# Patient Record
Sex: MALE | Race: WHITE | NOT HISPANIC OR LATINO
[De-identification: names, ages, dates, MRNs, and addresses within clinical notes are randomized per-mention and may not be internally consistent; named-entity substitution may affect disease eponyms.]

---

## 2018-09-18 PROBLEM — Z00.00 ENCOUNTER FOR PREVENTIVE HEALTH EXAMINATION: Status: ACTIVE | Noted: 2018-09-18

## 2018-09-24 ENCOUNTER — APPOINTMENT (OUTPATIENT)
Dept: NEUROLOGY | Facility: CLINIC | Age: 53
End: 2018-09-24
Payer: COMMERCIAL

## 2018-09-24 VITALS
HEIGHT: 68.5 IN | SYSTOLIC BLOOD PRESSURE: 116 MMHG | BODY MASS INDEX: 21.72 KG/M2 | WEIGHT: 145 LBS | HEART RATE: 61 BPM | DIASTOLIC BLOOD PRESSURE: 58 MMHG

## 2018-09-24 DIAGNOSIS — Z78.9 OTHER SPECIFIED HEALTH STATUS: ICD-10-CM

## 2018-09-24 PROCEDURE — 99244 OFF/OP CNSLTJ NEW/EST MOD 40: CPT

## 2018-11-16 ENCOUNTER — RX RENEWAL (OUTPATIENT)
Age: 53
End: 2018-11-16

## 2018-11-19 ENCOUNTER — OUTPATIENT (OUTPATIENT)
Dept: OUTPATIENT SERVICES | Facility: HOSPITAL | Age: 53
LOS: 1 days | End: 2018-11-19
Payer: COMMERCIAL

## 2018-11-19 DIAGNOSIS — R47.1 DYSARTHRIA AND ANARTHRIA: ICD-10-CM

## 2018-11-19 DIAGNOSIS — Z51.89 ENCOUNTER FOR OTHER SPECIFIED AFTERCARE: ICD-10-CM

## 2018-11-19 DIAGNOSIS — R47.9 UNSPECIFIED SPEECH DISTURBANCES: ICD-10-CM

## 2018-11-19 DIAGNOSIS — G70.9 MYONEURAL DISORDER, UNSPECIFIED: ICD-10-CM

## 2018-11-21 PROCEDURE — 97010 HOT OR COLD PACKS THERAPY: CPT

## 2018-11-21 PROCEDURE — 92523 SPEECH SOUND LANG COMPREHEN: CPT

## 2018-11-21 PROCEDURE — 97140 MANUAL THERAPY 1/> REGIONS: CPT

## 2018-11-21 PROCEDURE — 97110 THERAPEUTIC EXERCISES: CPT

## 2018-11-26 ENCOUNTER — OUTPATIENT (OUTPATIENT)
Dept: OUTPATIENT SERVICES | Facility: HOSPITAL | Age: 53
LOS: 1 days | End: 2018-11-26
Payer: COMMERCIAL

## 2018-11-26 DIAGNOSIS — R13.10 DYSPHAGIA, UNSPECIFIED: ICD-10-CM

## 2018-11-26 PROCEDURE — 74230 X-RAY XM SWLNG FUNCJ C+: CPT | Mod: 26

## 2018-11-26 PROCEDURE — 74230 X-RAY XM SWLNG FUNCJ C+: CPT

## 2018-11-29 ENCOUNTER — APPOINTMENT (OUTPATIENT)
Dept: NEUROLOGY | Facility: CLINIC | Age: 53
End: 2018-11-29
Payer: COMMERCIAL

## 2018-11-29 PROCEDURE — 95886 MUSC TEST DONE W/N TEST COMP: CPT

## 2018-11-29 PROCEDURE — 95910 NRV CNDJ TEST 7-8 STUDIES: CPT

## 2018-11-29 PROCEDURE — 99214 OFFICE O/P EST MOD 30 MIN: CPT | Mod: 25

## 2019-02-04 ENCOUNTER — APPOINTMENT (OUTPATIENT)
Dept: NEUROLOGY | Facility: CLINIC | Age: 54
End: 2019-02-04
Payer: COMMERCIAL

## 2019-02-04 VITALS
SYSTOLIC BLOOD PRESSURE: 130 MMHG | WEIGHT: 150 LBS | BODY MASS INDEX: 22.47 KG/M2 | HEART RATE: 65 BPM | DIASTOLIC BLOOD PRESSURE: 80 MMHG | HEIGHT: 68.5 IN

## 2019-02-04 PROCEDURE — 99214 OFFICE O/P EST MOD 30 MIN: CPT

## 2019-02-04 NOTE — ASSESSMENT
[FreeTextEntry1] : This nice gentleman has a slowly progressive dysarthria with dysphagia and upper motor neuron signs in arms and legs, as well as tongue fasciculations and fascics on the back with mild atrophy back muscles\par \par The DDx includes MND vs. spinal bulbar atrophy (Sawyer's syndrome) although the latter not likely, no gynecomastia\par \par Needs MRI C spine, testosterone level and will come back for repeat EMG and ultrasound of the tongue to document fasciculations.

## 2019-02-04 NOTE — HISTORY OF PRESENT ILLNESS
[FreeTextEntry1] : Last May started stumbling on words which has slowly progressed to constant slurred speech and in Aug started coughing on liquids and food.  He denies SOB. Denies weakness of the arms or legs.  Denies ptosis or diplopia.  No neck pain or HA.  Denies muscle twitches or cramps.  \par \par Had EMG by Dr. Fitzpatrick this past November which she demonstrated fascics.  AchR ab neg\par \par barium swallow study in November showed delayed transit, was instructed to chin tuck.  \par \par Denies family h/o neuromuscular disease

## 2019-02-04 NOTE — PHYSICAL EXAM
[General Appearance - Alert] : alert [General Appearance - In No Acute Distress] : in no acute distress [Person] : oriented to person [Place] : oriented to place [Time] : oriented to time [Short Term Intact] : short term memory intact [Remote Intact] : remote memory intact [Registration Intact] : recent registration memory intact [Concentration Intact] : normal concentrating ability [Visual Intact] : visual attention was ~T not ~L decreased [Naming Objects] : no difficulty naming common objects [Repeating Phrases] : no difficulty repeating a phrase [Writing A Sentence] : no difficulty writing a sentence [Fluency] : fluency intact [Comprehension] : comprehension intact [Reading] : reading intact [Past History] : adequate knowledge of personal past history [Cranial Nerves Optic (II)] : visual acuity intact bilaterally,  visual fields full to confrontation, pupils equal round and reactive to light [Cranial Nerves Oculomotor (III)] : extraocular motion intact [Cranial Nerves Trigeminal (V)] : facial sensation intact symmetrically [Cranial Nerves Facial (VII)] : face symmetrical [Cranial Nerves Vestibulocochlear (VIII)] : hearing was intact bilaterally [Cranial Nerves Glossopharyngeal (IX)] : tongue and palate midline [Cranial Nerves Accessory (XI - Cranial And Spinal)] : head turning and shoulder shrug symmetric [Motor Tone] : muscle tone was normal in all four extremities [Motor Strength] : muscle strength was normal in all four extremities [Motor Handedness Right-Handed] : the patient is right hand dominant [Sensation Tactile Decrease] : light touch was intact [Sensation Vibration Decrease] : vibration was intact [Proprioception] : proprioception was intact [Abnormal Walk] : normal gait [Balance] : balance was intact [2+] : Biceps left 2+ [4+] : Patella left 4+ [3+] : Ankle jerk left 3+ [Neck Appearance] : the appearance of the neck was normal [Neck Cervical Mass (___cm)] : no neck mass was observed [Jugular Venous Distention Increased] : there was no jugular-venous distention [Thyroid Diffuse Enlargement] : the thyroid was not enlarged [Thyroid Nodule] : there were no palpable thyroid nodules [] : no respiratory distress [Auscultation Breath Sounds / Voice Sounds] : lungs were clear to auscultation bilaterally [Heart Rate And Rhythm] : heart rate was normal and rhythm regular [Heart Sounds] : normal S1 and S2 [Heart Sounds Gallop] : no gallops [Murmurs] : no murmurs [Heart Sounds Pericardial Friction Rub] : no pericardial rub [Past-pointing] : there was no past-pointing [Tremor] : no tremor present [Plantar Reflex Right Only] : normal on the right [Plantar Reflex Left Only] : normal on the left [___] : absent on the right [___] : absent on the left [FreeTextEntry5] : few tongue fascics noted, no tongue atrophy but very slow movements  [FreeTextEntry6] : mild left scapular winging with mild periscapular atrophy, fascics noted left infraspinatus [FreeTextEntry9] : Right deltoid jerk, and right Negron sign

## 2019-02-07 ENCOUNTER — FORM ENCOUNTER (OUTPATIENT)
Age: 54
End: 2019-02-07

## 2019-02-08 ENCOUNTER — OUTPATIENT (OUTPATIENT)
Dept: OUTPATIENT SERVICES | Facility: HOSPITAL | Age: 54
LOS: 1 days | End: 2019-02-08

## 2019-02-08 ENCOUNTER — APPOINTMENT (OUTPATIENT)
Dept: MRI IMAGING | Facility: CLINIC | Age: 54
End: 2019-02-08
Payer: COMMERCIAL

## 2019-02-08 DIAGNOSIS — G12.20 MOTOR NEURON DISEASE, UNSPECIFIED: ICD-10-CM

## 2019-02-08 LAB
TESTOST BND SERPL-MCNC: 14.3 PG/ML
TESTOST SERPL-MCNC: 559.4 NG/DL

## 2019-02-08 PROCEDURE — 72141 MRI NECK SPINE W/O DYE: CPT | Mod: 26

## 2019-03-27 ENCOUNTER — APPOINTMENT (OUTPATIENT)
Dept: NEUROLOGY | Facility: CLINIC | Age: 54
End: 2019-03-27
Payer: COMMERCIAL

## 2019-03-27 LAB — CK SERPL-CCNC: 163 U/L

## 2019-03-27 PROCEDURE — 99213 OFFICE O/P EST LOW 20 MIN: CPT | Mod: 25

## 2019-03-27 PROCEDURE — 94010 BREATHING CAPACITY TEST: CPT

## 2019-03-27 PROCEDURE — 95885 MUSC TST DONE W/NERV TST LIM: CPT

## 2019-03-27 PROCEDURE — 95908 NRV CNDJ TST 3-4 STUDIES: CPT

## 2019-03-27 NOTE — ASSESSMENT
[FreeTextEntry1] : EMG/NCV today shows fascics and spontaneous activity in RLE and RUE and left FDI, with reduced recruitment of large motor units.  There are also fascics and positive sharps seen in right thoracic paraspinals.  \par \par Previous CK has been up to 1581.  PFT's today are reduced, with mild restrictive pattern, and he has new slight weakness of bilateral elbow flexors. \par \par Tongue US today shows deep fascics.  \par \par The above meet [place] and [place] criteria for probable/definite ALS.  Will move to the definite category when there is unquestionable weakness.  Will check anti-gangioside abs today but MMN with CB not a strong consideration.  \par \par Will have patient come back to discuss diagnosis with his wife present.

## 2019-03-27 NOTE — PHYSICAL EXAM
[Person] : oriented to person [Place] : oriented to place [Time] : oriented to time [Short Term Intact] : short term memory intact [Remote Intact] : remote memory intact [Registration Intact] : recent registration memory intact [Concentration Intact] : normal concentrating ability [Visual Intact] : visual attention was ~T not ~L decreased [Naming Objects] : no difficulty naming common objects [Repeating Phrases] : no difficulty repeating a phrase [Writing A Sentence] : no difficulty writing a sentence [Fluency] : fluency intact [Comprehension] : comprehension intact [Reading] : reading intact [Past History] : adequate knowledge of personal past history [Cranial Nerves Optic (II)] : visual acuity intact bilaterally,  visual fields full to confrontation, pupils equal round and reactive to light [Cranial Nerves Oculomotor (III)] : extraocular motion intact [Cranial Nerves Trigeminal (V)] : facial sensation intact symmetrically [Cranial Nerves Facial (VII)] : face symmetrical [Cranial Nerves Vestibulocochlear (VIII)] : hearing was intact bilaterally [Cranial Nerves Glossopharyngeal (IX)] : tongue and palate midline [Cranial Nerves Accessory (XI - Cranial And Spinal)] : head turning and shoulder shrug symmetric [Motor Tone] : muscle tone was normal in all four extremities [Motor Handedness Right-Handed] : the patient is right hand dominant [+4] : arm flexion +4/5 [5] : ankle plantar flexion 5/5 [Sensation Tactile Decrease] : light touch was intact [Sensation Vibration Decrease] : vibration was intact [Proprioception] : proprioception was intact [Abnormal Walk] : normal gait [Balance] : balance was intact [2+] : Biceps left 2+ [4+] : Patella left 4+ [3+] : Ankle jerk left 3+ [Plantar Reflex Right Only] : abnormal on the right [Hand Weakness Right] : normal hand  [Hand Weakness Left] : normal hand  [Past-pointing] : there was no past-pointing [Tremor] : no tremor present [Plantar Reflex Left Only] : normal on the left [___] : absent on the right [___] : absent on the left [FreeTextEntry5] : few tongue fascics noted, no tongue atrophy but very slow movements  [FreeTextEntry6] : mild left scapular winging with mild periscapular atrophy, fascics noted left infraspinatus. PFT's: FVC 5.7, FEV1 3.2 87% predicted [FreeTextEntry9] : Right deltoid jerk, and right Negron sign

## 2019-03-27 NOTE — HISTORY OF PRESENT ILLNESS
[FreeTextEntry1] : Patient notes no change since last visit, no weakness.  Here for EMG.  \par \par Testosterone normal.  MRI C spine normal

## 2019-03-30 LAB
GD1A GANGL IGG TITR SER IA: NORMAL
GD1A GANGL IGM TITR SER IA: NORMAL
GD1B GANGL IGG TITR SER: NORMAL
GD1B GANGL IGM TITR SER: NORMAL
GM1 ASIALO IGG TITR SER: NORMAL
GM1 ASIALO IGM TITR SER: NORMAL

## 2019-04-01 ENCOUNTER — APPOINTMENT (OUTPATIENT)
Dept: NEUROLOGY | Facility: CLINIC | Age: 54
End: 2019-04-01
Payer: COMMERCIAL

## 2019-04-01 VITALS
BODY MASS INDEX: 21.42 KG/M2 | WEIGHT: 143 LBS | HEART RATE: 87 BPM | DIASTOLIC BLOOD PRESSURE: 86 MMHG | HEIGHT: 68.5 IN | SYSTOLIC BLOOD PRESSURE: 130 MMHG

## 2019-04-01 PROCEDURE — 99215 OFFICE O/P EST HI 40 MIN: CPT

## 2019-04-01 NOTE — PHYSICAL EXAM
[Person] : oriented to person [Place] : oriented to place [Time] : oriented to time [Short Term Intact] : short term memory intact [Remote Intact] : remote memory intact [Registration Intact] : recent registration memory intact [Concentration Intact] : normal concentrating ability [Visual Intact] : visual attention was ~T not ~L decreased [Naming Objects] : no difficulty naming common objects [Repeating Phrases] : no difficulty repeating a phrase [Writing A Sentence] : no difficulty writing a sentence [Fluency] : fluency intact [Comprehension] : comprehension intact [Reading] : reading intact [Past History] : adequate knowledge of personal past history [Cranial Nerves Optic (II)] : visual acuity intact bilaterally,  visual fields full to confrontation, pupils equal round and reactive to light [Cranial Nerves Oculomotor (III)] : extraocular motion intact [Cranial Nerves Trigeminal (V)] : facial sensation intact symmetrically [Cranial Nerves Facial (VII)] : face symmetrical [Cranial Nerves Vestibulocochlear (VIII)] : hearing was intact bilaterally [Cranial Nerves Glossopharyngeal (IX)] : tongue and palate midline [Cranial Nerves Accessory (XI - Cranial And Spinal)] : head turning and shoulder shrug symmetric [Motor Tone] : muscle tone was normal in all four extremities [Motor Handedness Right-Handed] : the patient is right hand dominant [Hand Weakness Right] : normal hand  [+4] : arm flexion +4/5 [Hand Weakness Left] : normal hand  [5] : ankle plantar flexion 5/5 [Sensation Tactile Decrease] : light touch was intact [Sensation Vibration Decrease] : vibration was intact [Proprioception] : proprioception was intact [Abnormal Walk] : normal gait [Balance] : balance was intact [Past-pointing] : there was no past-pointing [Tremor] : no tremor present [2+] : Biceps left 2+ [4+] : Patella left 4+ [3+] : Ankle jerk left 3+ [Plantar Reflex Right Only] : abnormal on the right [Plantar Reflex Left Only] : normal on the left [___] : absent on the right [___] : absent on the left [FreeTextEntry5] : few tongue fascics noted, no tongue atrophy but very slow movements  [FreeTextEntry6] : mild left scapular winging with mild periscapular atrophy, fascics noted left infraspinatus. PFT's: FVC 5.7, FEV1 3.2 87% predicted\par ALS-FRS 47/48 [FreeTextEntry9] : Right deltoid jerk, and right Negron sign

## 2019-04-01 NOTE — HISTORY OF PRESENT ILLNESS
[FreeTextEntry1] : Patient presents with wife for results and diagnosis discussion\par \par Anti-GM1 Ab negative

## 2019-04-01 NOTE — ASSESSMENT
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  We will get genetic testing as the diagnosis is early and if he has C9 or SOD1 mutation he may qualify for clinical trials. \par \par Will start riluzole 50mg BID, check LFT's at next visit (baseline were normal).  Saliva management not needed.  They met social work today and will engage with him at next visit in two months. \par \par 90 min spent on encounter and counseling

## 2019-04-09 ENCOUNTER — FORM ENCOUNTER (OUTPATIENT)
Age: 54
End: 2019-04-09

## 2019-04-10 ENCOUNTER — APPOINTMENT (OUTPATIENT)
Dept: CT IMAGING | Facility: CLINIC | Age: 54
End: 2019-04-10
Payer: COMMERCIAL

## 2019-04-10 ENCOUNTER — LABORATORY RESULT (OUTPATIENT)
Age: 54
End: 2019-04-10

## 2019-04-10 ENCOUNTER — APPOINTMENT (OUTPATIENT)
Dept: INTERNAL MEDICINE | Facility: CLINIC | Age: 54
End: 2019-04-10
Payer: COMMERCIAL

## 2019-04-10 ENCOUNTER — OUTPATIENT (OUTPATIENT)
Dept: OUTPATIENT SERVICES | Facility: HOSPITAL | Age: 54
LOS: 1 days | End: 2019-04-10
Payer: COMMERCIAL

## 2019-04-10 VITALS
HEART RATE: 97 BPM | BODY MASS INDEX: 20.83 KG/M2 | OXYGEN SATURATION: 96 % | WEIGHT: 139 LBS | DIASTOLIC BLOOD PRESSURE: 72 MMHG | RESPIRATION RATE: 18 BRPM | HEIGHT: 68.5 IN | TEMPERATURE: 99.5 F | SYSTOLIC BLOOD PRESSURE: 120 MMHG

## 2019-04-10 DIAGNOSIS — Z00.8 ENCOUNTER FOR OTHER GENERAL EXAMINATION: ICD-10-CM

## 2019-04-10 PROCEDURE — 94060 EVALUATION OF WHEEZING: CPT

## 2019-04-10 PROCEDURE — 94729 DIFFUSING CAPACITY: CPT

## 2019-04-10 PROCEDURE — 99244 OFF/OP CNSLTJ NEW/EST MOD 40: CPT | Mod: 25

## 2019-04-10 PROCEDURE — ZZZZZ: CPT

## 2019-04-10 PROCEDURE — 94664 DEMO&/EVAL PT USE INHALER: CPT | Mod: 59

## 2019-04-10 PROCEDURE — 94727 GAS DIL/WSHOT DETER LNG VOL: CPT

## 2019-04-10 PROCEDURE — 71250 CT THORAX DX C-: CPT | Mod: 26

## 2019-04-10 PROCEDURE — 71250 CT THORAX DX C-: CPT

## 2019-04-10 NOTE — PROCEDURE
[FreeTextEntry1] : Full pulmonary function testing is within normal limits with an FEV1 of 3.95 her 111% predicted. Diffusing capacity is normal.\par \par CT scan of chest today shows opacities in the posterior left lower lobe consistent with pneumonia or aspiration pneumonia.  4 mm nodule in peripheral  right lower lobe. Partially visualized hypodense lesions in liver may be hemangiomas (MRI or CT with and without contrast recommended).

## 2019-04-10 NOTE — REVIEW OF SYSTEMS
[As Noted in HPI] : as noted in HPI [Cough] : cough [Sputum] : sputum  [Chest Discomfort] : no chest discomfort [Palpitations] : no palpitations [Dizziness] : no dizziness [Syncope] : no fainting

## 2019-04-10 NOTE — CONSULT LETTER
[Dear  ___] : Dear ~OSBALDO, [Consult Letter:] : I had the pleasure of evaluating your patient, [unfilled]. [Please see my note below.] : Please see my note below. [Consult Closing:] : Thank you very much for allowing me to participate in the care of this patient.  If you have any questions, please do not hesitate to contact me. [Sincerely,] : Sincerely, [FreeTextEntry2] : Dr Tadeo Serrano [FreeTextEntry3] : Noman Mueller MD

## 2019-04-10 NOTE — ASSESSMENT
[FreeTextEntry1] : #1 likely aspiration pneumonia posterior left lower lobe. CBC and CMP drawn today. Patient to treat with doxycycline 100 mg p.o. b.i.d. x10 days. Proair 2 puffs q.i.d. prn.  If doing well and improving for following appointment with Dr Way in one week. Call or return if not improving. Case discussed with patient's neurologist, Dr. SHAN Rosario.  He will arrange for a swallow evaluation. He also mentioned that he may have a discussion with patient regarding possible tube feeding versus no tube feeding. Patient is also scheduled to meet with  via neurology.\par \par #2 ALS. On Riluzole treatnment. \par \par #3  4 mm RLL nodule.  Consider repeat CT chest in 1 year.\par \par #4 Liver hypodense lesions seen on CT chest.  MRI or CT with and without contrast recommended. Address after treatment of acute illness.\par \par

## 2019-04-10 NOTE — PHYSICAL EXAM
[General Appearance - Well Developed] : well developed [Normal Appearance] : normal appearance [Well Groomed] : well groomed [General Appearance - Well Nourished] : well nourished [No Deformities] : no deformities [General Appearance - In No Acute Distress] : no acute distress [Normal Conjunctiva] : the conjunctiva exhibited no abnormalities [Eyelids - No Xanthelasma] : the eyelids demonstrated no xanthelasmas [Normal Oropharynx] : normal oropharynx [Neck Appearance] : the appearance of the neck was normal [Neck Cervical Mass (___cm)] : no neck mass was observed [Jugular Venous Distention Increased] : there was no jugular-venous distention [Thyroid Diffuse Enlargement] : the thyroid was not enlarged [Heart Rate And Rhythm] : heart rate and rhythm were normal [Heart Sounds] : normal S1 and S2 [Murmurs] : no murmurs present [Edema] : no peripheral edema present [Respiration, Rhythm And Depth] : normal respiratory rhythm and effort [Exaggerated Use Of Accessory Muscles For Inspiration] : no accessory muscle use [Abdomen Soft] : soft [Abdomen Tenderness] : non-tender [Abdomen Mass (___ Cm)] : no abdominal mass palpated [Nail Clubbing] : no clubbing of the fingernails [Cyanosis, Localized] : no localized cyanosis [Skin Turgor] : normal skin turgor [] : no rash [Impaired Insight] : insight and judgment were intact [Affect] : the affect was normal [FreeTextEntry1] : see above. Slow speech

## 2019-04-10 NOTE — HISTORY OF PRESENT ILLNESS
[FreeTextEntry1] : This is a pleasant 53-year-old male referred for pulmonary consultation by Dr ERNESTO Serrano.  A report will be sent to him.  The patient has a history of recently diagnosed ALS.  He was started on Riluzole treatment on April 1. He developed an illness on March 17 in which he felt run down and had nasal congestion and felt sick. After a few days, he developed cough which has persisted. He was treated with amoxicillin for 10 days on March 25 and then more recently with Z-Talon 4 days ago. He continues to have cough with some light green sputum production. Temperature date today is 99 5.  He is not noting wheezing or dyspnea on exertion. No hemoptysis. He is a nonsmoker. No past history of asthma or COPD. He was told that with his ALS, he does have weakness of his tongue and bulbar involvement and is at risk for aspiration. He tells me that he almost choked in the past on 3 or 4 occasions.\par \par He denies fever or chills, though his temperature here today is 99.5.\par \par He denies being around sick contacts.

## 2019-04-11 LAB
ALBUMIN SERPL ELPH-MCNC: 4.7 G/DL
ALP BLD-CCNC: 97 U/L
ALT SERPL-CCNC: 18 U/L
ANION GAP SERPL CALC-SCNC: 13 MMOL/L
AST SERPL-CCNC: 22 U/L
BASOPHILS # BLD AUTO: 0.09 K/UL
BASOPHILS NFR BLD AUTO: 0.7 %
BILIRUB SERPL-MCNC: 0.3 MG/DL
BUN SERPL-MCNC: 15 MG/DL
CALCIUM SERPL-MCNC: 9.7 MG/DL
CHLORIDE SERPL-SCNC: 102 MMOL/L
CO2 SERPL-SCNC: 28 MMOL/L
CREAT SERPL-MCNC: 0.95 MG/DL
EOSINOPHIL # BLD AUTO: 0.15 K/UL
EOSINOPHIL NFR BLD AUTO: 1.2 %
GLUCOSE SERPL-MCNC: 128 MG/DL
HCT VFR BLD CALC: 44.7 %
HGB BLD-MCNC: 14.8 G/DL
IMM GRANULOCYTES NFR BLD AUTO: 0.3 %
LYMPHOCYTES # BLD AUTO: 5.22 K/UL
LYMPHOCYTES NFR BLD AUTO: 40 %
MAN DIFF?: NORMAL
MCHC RBC-ENTMCNC: 31.2 PG
MCHC RBC-ENTMCNC: 33.1 GM/DL
MCV RBC AUTO: 94.3 FL
MONOCYTES # BLD AUTO: 1.09 K/UL
MONOCYTES NFR BLD AUTO: 8.4 %
NEUTROPHILS # BLD AUTO: 6.45 K/UL
NEUTROPHILS NFR BLD AUTO: 49.4 %
PLATELET # BLD AUTO: 293 K/UL
POTASSIUM SERPL-SCNC: 3.8 MMOL/L
PROT SERPL-MCNC: 7.1 G/DL
RBC # BLD: 4.74 M/UL
RBC # FLD: 12.4 %
SODIUM SERPL-SCNC: 143 MMOL/L
WBC # FLD AUTO: 13.04 K/UL

## 2019-04-17 ENCOUNTER — NON-APPOINTMENT (OUTPATIENT)
Age: 54
End: 2019-04-17

## 2019-04-17 ENCOUNTER — APPOINTMENT (OUTPATIENT)
Dept: INTERNAL MEDICINE | Facility: CLINIC | Age: 54
End: 2019-04-17
Payer: COMMERCIAL

## 2019-04-17 VITALS
BODY MASS INDEX: 21.07 KG/M2 | OXYGEN SATURATION: 97 % | HEART RATE: 70 BPM | DIASTOLIC BLOOD PRESSURE: 80 MMHG | RESPIRATION RATE: 16 BRPM | SYSTOLIC BLOOD PRESSURE: 120 MMHG | WEIGHT: 139 LBS | TEMPERATURE: 98.6 F | HEIGHT: 68 IN

## 2019-04-17 DIAGNOSIS — Z82.3 FAMILY HISTORY OF STROKE: ICD-10-CM

## 2019-04-17 DIAGNOSIS — Z82.0 FAMILY HISTORY OF EPILEPSY AND OTHER DISEASES OF THE NERVOUS SYSTEM: ICD-10-CM

## 2019-04-17 DIAGNOSIS — Z81.8 FAMILY HISTORY OF OTHER MENTAL AND BEHAVIORAL DISORDERS: ICD-10-CM

## 2019-04-17 DIAGNOSIS — Z82.61 FAMILY HISTORY OF ARTHRITIS: ICD-10-CM

## 2019-04-17 PROCEDURE — 99214 OFFICE O/P EST MOD 30 MIN: CPT | Mod: 25

## 2019-04-17 PROCEDURE — 94060 EVALUATION OF WHEEZING: CPT

## 2019-04-17 NOTE — PLAN
[FreeTextEntry1] : 1. The patient will complete a full course of doxycycline as prescribed.\par \par 2. The patient will begin Mucinex 600 mg b.i.d. on a chronic basis to act as a mucolytic agent.\par \par 3. The patient will purchase a [employer] Threshold Intermittent Muscle Trainer (IMT), on line. I have told him that he could use this device as many times during the day as he can. He can increase the resistance. The objectives will be to help to improve his muscle strength regarding his internal and external intercostals, as well as his diaphragm.\par \par 4. The patient may resume his regular exercise after completing the doxycycline.\par \par 5. The patient will undergo a repeat CAT scan of the chest in 6 weeks to reevaluate the left lower lobe. I am not certain whether the findings noted on the CAT scan represented just areas of bronchiectasis with mucus plugging versus an acute pneumonic process (possibly including an aspiration syndrome) versus an atypical infection such as MAHSA due to the underlying bronchiectatic changes in that location.\par \par 6. The patient will followup with me in 6 weeks for a reevaluation with pre-post spirometry. Based on the findings of the CAT scan at that time, I may consider institution of Zithromax 500 mg t.i.w. as chronic treatment for the underlying bronchiectasis. At some point in the future, he may need to obtain a device for him to measure his vital capacity and negative inspiratory force on a daily basis. Again, we will be monitoring for progressive respiratory fatigue.\par \par 7. Routine medical followup with his primary care physician, Dr. Serrano.

## 2019-04-17 NOTE — PHYSICAL EXAM
[Well Nourished] : well nourished [Supple] : supple [No Respiratory Distress] : no respiratory distress  [Normal Oropharynx] : the oropharynx was normal [Normal Rate] : normal rate  [Normal S1, S2] : normal S1 and S2 [Regular Rhythm] : with a regular rhythm [Soft] : abdomen soft [No Extremity Clubbing/Cyanosis] : no extremity clubbing/cyanosis [No Edema] : there was no peripheral edema [Normal Bowel Sounds] : normal bowel sounds [Normal Posterior Cervical Nodes] : no posterior cervical lymphadenopathy [Normal Supraclavicular Nodes] : no supraclavicular lymphadenopathy [No CVA Tenderness] : no CVA  tenderness [Normal Anterior Cervical Nodes] : no anterior cervical lymphadenopathy [Grossly Normal Strength/Tone] : grossly normal strength/tone [de-identified] : There are few coarse breath sounds at the left base. A minimal end expiratory wheezes noted

## 2019-04-17 NOTE — DATA REVIEWED
[FreeTextEntry1] : Spirometric analysis is within normal limits. Mild bronchodilator reactivity is demonstrated.\par \par CAT scan of the chest performed on 4/10/19 is reviewed. The patient is noted to have changes in the left lower lung zone which initially were read as groundglass and tree in bud opacities. I reviewed this further with the radiologist, because I felt that there may be a component of bronchiectasis. He now agrees, and stated that he would place an addendum to his original reading. This may represent mucous plugging.

## 2019-04-17 NOTE — HISTORY OF PRESENT ILLNESS
[de-identified] : The patient comes in today for a followup pulmonary evaluation.\par \par The patient was initially seen, one week ago, and Dr. Mueller. He ordered a CAT scan of the chest, due to the fact that the patient has had a chronic cough. The patient was recently also diagnosed with ALS. A CAT scan of the chest revealed an infiltrative process in the left lower lungs. He was initially read as pneumonia. There was also a small 4 mm nodule in the right lower lobe peripherally. He was placed on doxycycline 100 mg b.i.d. for 10 days.\par \par Patient now comes in stating that overall he does feel better with regard to his pulmonary status. He notes that the symptoms have diminished significantly since starting the antibiotic. He has scant sputum. He never felt as if he had an acute febrile process. There was no significant shortness of breath. He denies having any choking when swallowing.\par \par The patient notes that his muscle strength is still good. His major issue continues to be that of speech and bulbar function. Of note is the fact that he was exercising up until approximately one month ago. He did not feel any weakness in his peripheral muscles. He now comes in for this assessment

## 2019-04-18 ENCOUNTER — TRANSCRIPTION ENCOUNTER (OUTPATIENT)
Age: 54
End: 2019-04-18

## 2019-05-15 ENCOUNTER — TRANSCRIPTION ENCOUNTER (OUTPATIENT)
Age: 54
End: 2019-05-15

## 2019-05-16 ENCOUNTER — TRANSCRIPTION ENCOUNTER (OUTPATIENT)
Age: 54
End: 2019-05-16

## 2019-05-30 ENCOUNTER — TRANSCRIPTION ENCOUNTER (OUTPATIENT)
Age: 54
End: 2019-05-30

## 2019-06-04 ENCOUNTER — FORM ENCOUNTER (OUTPATIENT)
Age: 54
End: 2019-06-04

## 2019-06-05 ENCOUNTER — OTHER (OUTPATIENT)
Age: 54
End: 2019-06-05

## 2019-06-05 ENCOUNTER — APPOINTMENT (OUTPATIENT)
Dept: NEUROLOGY | Facility: CLINIC | Age: 54
End: 2019-06-05

## 2019-06-05 ENCOUNTER — APPOINTMENT (OUTPATIENT)
Dept: NEUROLOGY | Facility: CLINIC | Age: 54
End: 2019-06-05
Payer: COMMERCIAL

## 2019-06-05 ENCOUNTER — APPOINTMENT (OUTPATIENT)
Dept: CT IMAGING | Facility: CLINIC | Age: 54
End: 2019-06-05
Payer: COMMERCIAL

## 2019-06-05 ENCOUNTER — OUTPATIENT (OUTPATIENT)
Dept: OUTPATIENT SERVICES | Facility: HOSPITAL | Age: 54
LOS: 1 days | End: 2019-06-05
Payer: COMMERCIAL

## 2019-06-05 VITALS
BODY MASS INDEX: 22.43 KG/M2 | HEIGHT: 68 IN | WEIGHT: 148 LBS | DIASTOLIC BLOOD PRESSURE: 88 MMHG | SYSTOLIC BLOOD PRESSURE: 153 MMHG | HEART RATE: 80 BPM

## 2019-06-05 DIAGNOSIS — Z00.8 ENCOUNTER FOR OTHER GENERAL EXAMINATION: ICD-10-CM

## 2019-06-05 PROCEDURE — 71250 CT THORAX DX C-: CPT

## 2019-06-05 PROCEDURE — 99215 OFFICE O/P EST HI 40 MIN: CPT

## 2019-06-05 PROCEDURE — 71250 CT THORAX DX C-: CPT | Mod: 26

## 2019-06-05 NOTE — PHYSICAL EXAM
[Person] : oriented to person [Place] : oriented to place [Time] : oriented to time [Short Term Intact] : short term memory intact [Remote Intact] : remote memory intact [Registration Intact] : recent registration memory intact [Concentration Intact] : normal concentrating ability [Visual Intact] : visual attention was ~T not ~L decreased [Naming Objects] : no difficulty naming common objects [Fluency] : fluency intact [Writing A Sentence] : no difficulty writing a sentence [Repeating Phrases] : no difficulty repeating a phrase [Reading] : reading intact [Comprehension] : comprehension intact [Past History] : adequate knowledge of personal past history [Cranial Nerves Oculomotor (III)] : extraocular motion intact [Cranial Nerves Optic (II)] : visual acuity intact bilaterally,  visual fields full to confrontation, pupils equal round and reactive to light [Cranial Nerves Facial (VII)] : face symmetrical [Cranial Nerves Trigeminal (V)] : facial sensation intact symmetrically [Cranial Nerves Vestibulocochlear (VIII)] : hearing was intact bilaterally [Cranial Nerves Accessory (XI - Cranial And Spinal)] : head turning and shoulder shrug symmetric [Cranial Nerves Glossopharyngeal (IX)] : tongue and palate midline [Motor Tone] : muscle tone was normal in all four extremities [Motor Handedness Right-Handed] : the patient is right hand dominant [Hand Weakness Right] : normal hand  [+4] : arm flexion +4/5 [Hand Weakness Left] : normal hand  [5] : ankle plantar flexion 5/5 [Sensation Tactile Decrease] : light touch was intact [Sensation Vibration Decrease] : vibration was intact [Proprioception] : proprioception was intact [Abnormal Walk] : normal gait [Past-pointing] : there was no past-pointing [Balance] : balance was intact [Tremor] : no tremor present [2+] : Ankle jerk right 2+ [4+] : Patella left 4+ [Plantar Reflex Right Only] : abnormal on the right [3+] : Ankle jerk left 3+ [___] : absent on the right [Plantar Reflex Left Only] : normal on the left [FreeTextEntry5] : few tongue fascics noted, no tongue atrophy but very slow movements  [___] : absent on the left [FreeTextEntry6] : mild left scapular winging with mild periscapular atrophy, fascics noted left infraspinatus. PFT's: FVC 5.7, FEV1 3.2 87% predicted\par ALS-FRS 46/48 [FreeTextEntry9] : Right deltoid jerk, and right Negron sign

## 2019-06-05 NOTE — ASSESSMENT
[FreeTextEntry1] : Patient is doing very well, only 1 point drop in ALS-FRS.  Has started going to support group.\par \par Cont riluzole 50mg BID, check LFT's and CBC today. \par \par Seeing social work today.  We have discussed that he likely will need PEG feedings by end of the year, he has prescription for swallow eval and will do that this summer.  He starts disability July 1. \par \par I have discussed ventilation with patient and told him we will monitor and when he shows signs of SOB will discuss his wishes for NIV or intubation at that time.  \par

## 2019-06-05 NOTE — HISTORY OF PRESENT ILLNESS
[FreeTextEntry1] : Patient and wife think his speech is worse.  He has noted some arm twitches.  Denies muscle cramps.  Tolerating riluzole.  No swallowing problems, no coughing on liquids lately.  No pooling or excess saliva.  Denies any limb weakness.  He has req for genetics but hasn't had drawn yet.  Has met with Arturo and will see today.  \par \dina Has met with [facility] social work and is going to support group.

## 2019-06-06 ENCOUNTER — NON-APPOINTMENT (OUTPATIENT)
Age: 54
End: 2019-06-06

## 2019-06-06 ENCOUNTER — TRANSCRIPTION ENCOUNTER (OUTPATIENT)
Age: 54
End: 2019-06-06

## 2019-06-06 ENCOUNTER — APPOINTMENT (OUTPATIENT)
Dept: INTERNAL MEDICINE | Facility: CLINIC | Age: 54
End: 2019-06-06
Payer: COMMERCIAL

## 2019-06-06 VITALS
RESPIRATION RATE: 18 BRPM | HEIGHT: 68 IN | BODY MASS INDEX: 22.73 KG/M2 | WEIGHT: 150 LBS | OXYGEN SATURATION: 97 % | SYSTOLIC BLOOD PRESSURE: 126 MMHG | HEART RATE: 71 BPM | TEMPERATURE: 98.9 F | DIASTOLIC BLOOD PRESSURE: 84 MMHG

## 2019-06-06 DIAGNOSIS — J69.0 PNEUMONITIS DUE TO INHALATION OF FOOD AND VOMIT: ICD-10-CM

## 2019-06-06 PROCEDURE — 99214 OFFICE O/P EST MOD 30 MIN: CPT | Mod: 25

## 2019-06-06 PROCEDURE — 94060 EVALUATION OF WHEEZING: CPT

## 2019-06-06 RX ORDER — ALBUTEROL SULFATE 90 UG/1
108 (90 BASE) AEROSOL, METERED RESPIRATORY (INHALATION)
Qty: 1 | Refills: 0 | Status: DISCONTINUED | COMMUNITY
Start: 2019-04-10 | End: 2019-06-06

## 2019-06-06 RX ORDER — DOXYCYCLINE 100 MG/1
100 CAPSULE ORAL
Qty: 20 | Refills: 0 | Status: DISCONTINUED | COMMUNITY
Start: 2019-04-10 | End: 2019-06-06

## 2019-06-06 NOTE — PLAN
[FreeTextEntry1] : 1. Continue with medication as outlined above, including Mucinex on a b.i.d. basis as a mucolytic agent.\par \par 2. Given the fact that there has been resolution of the infiltrates and mucus plugging previously documented, we will hold off on Zithromax as treatment for bronchiectasis at this time. We will reconsider this in the future.\par \par 3. The patient will continue with his Respironics threshold intermittent muscle , at least twice a day to maintain his respiratory muscle function.\par \par 4. The patient may need a device to measure his vital capacity and negative inspiratory force in the future.\par \par 5. The patient may require a PEG later in the year for nutrition.\par \par 6. Routine medical followup with Dr. Serrano.\par \par 7. Follow up with myself in 4 months with spirometry and O2 saturation.

## 2019-06-06 NOTE — PHYSICAL EXAM
[Well Nourished] : well nourished [Normal Oropharynx] : the oropharynx was normal [Supple] : supple [No Respiratory Distress] : no respiratory distress  [Clear to Auscultation] : lungs were clear to auscultation bilaterally [Normal Rate] : normal rate  [Regular Rhythm] : with a regular rhythm [Normal S1, S2] : normal S1 and S2 [No Edema] : there was no peripheral edema [No Extremity Clubbing/Cyanosis] : no extremity clubbing/cyanosis [Normal Bowel Sounds] : normal bowel sounds [Soft] : abdomen soft [Normal Posterior Cervical Nodes] : no posterior cervical lymphadenopathy [Normal Supraclavicular Nodes] : no supraclavicular lymphadenopathy [Normal Anterior Cervical Nodes] : no anterior cervical lymphadenopathy [No CVA Tenderness] : no CVA  tenderness [Grossly Normal Strength/Tone] : grossly normal strength/tone

## 2019-06-06 NOTE — DATA REVIEWED
[FreeTextEntry1] : Spirometric analysis is within normal limits. Minimal bronchodilator reactivity is demonstrated.\par \par CAT scan of the chest performed on 6/5/19 is reviewed. The previously noted tree in bud/infiltrate in the left lower lung field has improved. A small nodule remains.

## 2019-06-06 NOTE — HISTORY OF PRESENT ILLNESS
[de-identified] : The patient comes in today for a followup evaluation and reassessment. There are several issues to discuss.\par \par The patient was last seen on 4/17/19 for a pulmonary assessment. The patient was recently diagnosed with bulbar ALS. A CAT scan of the chest at that time revealed an infiltrative process in the lower lung zones. There was concern that he may have aspirated. He was treated aggressively with doxycycline. He was also placed on Mucinex as a mucolytic agent due to the fact that he appeared to have bronchiectatic changes in his lungs. He noted that within several days the cough improved significantly.\par \par The patient also obtained a threshold intermittent muscle . This was to help improve his respiratory muscle function and increase his resistance. He states that he has been fairly compliant with this using this once or twice a day. He was recently reevaluated by his neurologist and his strength remains fairly stable at this time. The patient continues to exercise fairly regularly performing pushups and pull-ups. He denies any overt dyspnea. There has been no cough. He has been eating well, and he has gained 10 pounds. He states that he has not had any choking episodes while swallowing.

## 2019-06-10 ENCOUNTER — TRANSCRIPTION ENCOUNTER (OUTPATIENT)
Age: 54
End: 2019-06-10

## 2019-06-11 ENCOUNTER — TRANSCRIPTION ENCOUNTER (OUTPATIENT)
Age: 54
End: 2019-06-11

## 2019-06-20 ENCOUNTER — TRANSCRIPTION ENCOUNTER (OUTPATIENT)
Age: 54
End: 2019-06-20

## 2019-07-18 ENCOUNTER — LABORATORY RESULT (OUTPATIENT)
Age: 54
End: 2019-07-18

## 2019-07-18 LAB
ALBUMIN SERPL ELPH-MCNC: 4.9 G/DL
ALP BLD-CCNC: 88 U/L
ALT SERPL-CCNC: 20 U/L
AST SERPL-CCNC: 23 U/L
BASOPHILS # BLD AUTO: 0.05 K/UL
BASOPHILS NFR BLD AUTO: 0.5 %
BILIRUB DIRECT SERPL-MCNC: 0.2 MG/DL
BILIRUB INDIRECT SERPL-MCNC: 0.5 MG/DL
BILIRUB SERPL-MCNC: 0.6 MG/DL
EOSINOPHIL # BLD AUTO: 0.13 K/UL
EOSINOPHIL NFR BLD AUTO: 1.3 %
HCT VFR BLD CALC: 46.3 %
HGB BLD-MCNC: 15.4 G/DL
IMM GRANULOCYTES NFR BLD AUTO: 0.3 %
LYMPHOCYTES # BLD AUTO: 4.56 K/UL
LYMPHOCYTES NFR BLD AUTO: 45.1 %
MAN DIFF?: NORMAL
MCHC RBC-ENTMCNC: 31.2 PG
MCHC RBC-ENTMCNC: 33.3 GM/DL
MCV RBC AUTO: 93.9 FL
MONOCYTES # BLD AUTO: 0.72 K/UL
MONOCYTES NFR BLD AUTO: 7.1 %
NEUTROPHILS # BLD AUTO: 4.61 K/UL
NEUTROPHILS NFR BLD AUTO: 45.7 %
PLATELET # BLD AUTO: 220 K/UL
PROT SERPL-MCNC: 6.9 G/DL
RBC # BLD: 4.93 M/UL
RBC # FLD: 12.9 %
WBC # FLD AUTO: 10.1 K/UL

## 2019-08-08 ENCOUNTER — TRANSCRIPTION ENCOUNTER (OUTPATIENT)
Age: 54
End: 2019-08-08

## 2019-09-16 ENCOUNTER — APPOINTMENT (OUTPATIENT)
Dept: NEUROLOGY | Facility: CLINIC | Age: 54
End: 2019-09-16
Payer: COMMERCIAL

## 2019-09-16 VITALS
HEART RATE: 87 BPM | SYSTOLIC BLOOD PRESSURE: 147 MMHG | BODY MASS INDEX: 22.58 KG/M2 | DIASTOLIC BLOOD PRESSURE: 88 MMHG | WEIGHT: 149 LBS | HEIGHT: 68 IN

## 2019-09-16 PROCEDURE — 99214 OFFICE O/P EST MOD 30 MIN: CPT

## 2019-09-16 NOTE — HISTORY OF PRESENT ILLNESS
[FreeTextEntry1] : Patient and wife state his speech is worse.  He states he has some trouble chewing due to tongue, occasional trouble swallowing pills.  He denies arm or leg weakness, or muscle cramps.  Gets rare twitching.  He was a  this past summer.  He is now on disability. He has seen our .  \par \par No problems with saliva.

## 2019-09-16 NOTE — ASSESSMENT
[FreeTextEntry1] : Patient has not changed hardly at all in 3 months which is a good prognostic indicator.  \par \par He needs, however, speech evaluation for AAC ([employer])\par \par f/u 3 months with me.  Eventually will need ALSA clinic day\par \par

## 2019-09-16 NOTE — PHYSICAL EXAM
[Person] : oriented to person [Place] : oriented to place [Time] : oriented to time [Short Term Intact] : short term memory intact [Registration Intact] : recent registration memory intact [Remote Intact] : remote memory intact [Concentration Intact] : normal concentrating ability [Visual Intact] : visual attention was ~T not ~L decreased [Naming Objects] : no difficulty naming common objects [Repeating Phrases] : no difficulty repeating a phrase [Writing A Sentence] : no difficulty writing a sentence [Comprehension] : comprehension intact [Fluency] : fluency intact [Reading] : reading intact [Past History] : adequate knowledge of personal past history [Cranial Nerves Optic (II)] : visual acuity intact bilaterally,  visual fields full to confrontation, pupils equal round and reactive to light [Cranial Nerves Oculomotor (III)] : extraocular motion intact [Cranial Nerves Facial (VII)] : face symmetrical [Cranial Nerves Trigeminal (V)] : facial sensation intact symmetrically [Cranial Nerves Vestibulocochlear (VIII)] : hearing was intact bilaterally [Cranial Nerves Glossopharyngeal (IX)] : tongue and palate midline [Cranial Nerves Accessory (XI - Cranial And Spinal)] : head turning and shoulder shrug symmetric [Motor Strength] : muscle strength was normal in all four extremities [Motor Tone] : muscle tone was normal in all four extremities [Motor Handedness Right-Handed] : the patient is right hand dominant [Sensation Tactile Decrease] : light touch was intact [5] : ankle plantar flexion 5/5 [Proprioception] : proprioception was intact [Sensation Vibration Decrease] : vibration was intact [Abnormal Walk] : normal gait [Balance] : balance was intact [4+] : Patella left 4+ [2+] : Ankle jerk right 2+ [3+] : Ankle jerk left 3+ [Plantar Reflex Right Only] : abnormal on the right [Hand Weakness Right] : normal hand  [Hand Weakness Left] : normal hand  [Past-pointing] : there was no past-pointing [Tremor] : no tremor present [Plantar Reflex Left Only] : normal on the left [___] : absent on the right [___] : absent on the left [FreeTextEntry5] : tongue fascics noted, no tongue atrophy but very slow movements  [FreeTextEntry6] : mild left scapular winging with mild periscapular atrophy, fascics noted left infraspinatus. PFT's: FVC 5.7, FEV1 3.2 87% predicted\par ALS-FRS 45/48 [FreeTextEntry9] : Right deltoid jerk, and right Negron sign

## 2019-09-21 ENCOUNTER — RX RENEWAL (OUTPATIENT)
Age: 54
End: 2019-09-21

## 2019-10-03 ENCOUNTER — APPOINTMENT (OUTPATIENT)
Dept: SPEECH THERAPY | Facility: CLINIC | Age: 54
End: 2019-10-03
Payer: COMMERCIAL

## 2019-10-03 PROCEDURE — 92610 EVALUATE SWALLOWING FUNCTION: CPT | Mod: GN

## 2019-10-10 ENCOUNTER — APPOINTMENT (OUTPATIENT)
Dept: INTERNAL MEDICINE | Facility: CLINIC | Age: 54
End: 2019-10-10
Payer: COMMERCIAL

## 2019-10-10 ENCOUNTER — NON-APPOINTMENT (OUTPATIENT)
Age: 54
End: 2019-10-10

## 2019-10-10 VITALS
HEART RATE: 66 BPM | HEIGHT: 68 IN | WEIGHT: 150 LBS | DIASTOLIC BLOOD PRESSURE: 76 MMHG | TEMPERATURE: 98.5 F | OXYGEN SATURATION: 97 % | BODY MASS INDEX: 22.73 KG/M2 | SYSTOLIC BLOOD PRESSURE: 122 MMHG | RESPIRATION RATE: 16 BRPM

## 2019-10-10 DIAGNOSIS — R91.1 SOLITARY PULMONARY NODULE: ICD-10-CM

## 2019-10-10 PROCEDURE — 99214 OFFICE O/P EST MOD 30 MIN: CPT | Mod: 25

## 2019-10-10 PROCEDURE — 94010 BREATHING CAPACITY TEST: CPT

## 2019-10-10 NOTE — HISTORY OF PRESENT ILLNESS
[de-identified] : This patient is here for an evaluation of his pulmonary status.\par \par This patient has a history of ALS. He reports that he has been having difficulty chewing, eating, and speaking. He states that he has no trouble with saliva or swallowing. Dr. Rosario in neurology closely monitors his ALS. His visit this past September revealed decreased speech capability. However, his muscle strength remains high. He reports no changes in diet, weight, or bowel habits. He is compliant with his Riluzole medication.\par \par With regard to his respiratory health, he has a muscle  that he uses once per day. He states that he will be using it more often. He denies a cough, mucous, or phlegm. He no longer takes Mucinex due to a lack of mucous or cough.\par \par Today he comes in today for this assessment.

## 2019-10-10 NOTE — PLAN
[FreeTextEntry1] : 1. Continue with medication as per neurology.\par \par 2. Respiratory muscle  at least twice per day.\par \par 3. The patient may need a device to measure his vital capacity and negative inspiratory force in the future.\par \par 4. The patient may require a PEG in the future for nutrition.\par \par 5. Routine follow up with PCP Dr. Serrano.\par \par 6. Follow up with Dr. Rosario in neurology\par \par 7. Follow up with myself in 4 months for spirometry.

## 2019-10-10 NOTE — PHYSICAL EXAM
[Well Nourished] : well nourished [Supple] : supple [Normal Oropharynx] : the oropharynx was normal [Normal Rate] : normal rate  [Clear to Auscultation] : lungs were clear to auscultation bilaterally [No Respiratory Distress] : no respiratory distress  [Normal S1, S2] : normal S1 and S2 [Regular Rhythm] : with a regular rhythm [No Edema] : there was no peripheral edema [No Extremity Clubbing/Cyanosis] : no extremity clubbing/cyanosis [Normal Bowel Sounds] : normal bowel sounds [Soft] : abdomen soft [Normal Posterior Cervical Nodes] : no posterior cervical lymphadenopathy [Normal Supraclavicular Nodes] : no supraclavicular lymphadenopathy [Normal Anterior Cervical Nodes] : no anterior cervical lymphadenopathy [No CVA Tenderness] : no CVA  tenderness [Grossly Normal Strength/Tone] : grossly normal strength/tone

## 2019-10-10 NOTE — END OF VISIT
[FreeTextEntry3] : I, Raad Matias, acted solely as a scribe for Dr. Jesus Way MD on this date 10/10/2019. \par \par All medical records entries made by the Scribe were at my, Dr. Jesus Way MD, direction and personally dictated by me on 10/10/2019. I have personally reviewed the chart and agree that the record accurately reflects my personal performance of the history, physical exam, assessment, and plan. I have also personally directed, reviewed, and agreed with the chart.

## 2019-10-11 ENCOUNTER — TRANSCRIPTION ENCOUNTER (OUTPATIENT)
Age: 54
End: 2019-10-11

## 2019-12-02 ENCOUNTER — TRANSCRIPTION ENCOUNTER (OUTPATIENT)
Age: 54
End: 2019-12-02

## 2019-12-09 ENCOUNTER — APPOINTMENT (OUTPATIENT)
Dept: NEUROLOGY | Facility: CLINIC | Age: 54
End: 2019-12-09
Payer: COMMERCIAL

## 2019-12-09 VITALS
WEIGHT: 150 LBS | SYSTOLIC BLOOD PRESSURE: 141 MMHG | BODY MASS INDEX: 22.73 KG/M2 | HEIGHT: 68 IN | DIASTOLIC BLOOD PRESSURE: 81 MMHG | HEART RATE: 68 BPM

## 2019-12-09 PROCEDURE — 97167 OT EVAL HIGH COMPLEX 60 MIN: CPT

## 2019-12-09 PROCEDURE — 97163 PT EVAL HIGH COMPLEX 45 MIN: CPT

## 2019-12-09 PROCEDURE — 94010 BREATHING CAPACITY TEST: CPT

## 2019-12-09 PROCEDURE — 99215 OFFICE O/P EST HI 40 MIN: CPT | Mod: 25

## 2019-12-09 PROCEDURE — 92610 EVALUATE SWALLOWING FUNCTION: CPT

## 2019-12-09 NOTE — ASSESSMENT
[FreeTextEntry1] : Patient fairly stable with ALS-FRS (one point decrease) and symptoms although he is speech is becoming worse.  Still no weakness in limbs. \par \par PT/OT evaluation done today and no needs by their assessment.  \par \par SLP evaluation: remain soft/thin liquids, patient not interested in AAC currently.  PEG not currently indicated, will follow closely.  PFT's normal.  \par \par Discussed TBK1 mutation with him and wife today.  This indicates possibly an aggressive form of ALS and justifies adding treatment with edaravone.  \par \par Social work evaluation: no current needs.

## 2019-12-09 NOTE — HISTORY OF PRESENT ILLNESS
[FreeTextEntry1] : Patient here for clinic f/u.  States that speech is becoming more difficult to understand, swallow ok, eating well, takes protein drinks.  \par \par Denies arm or leg weakness.  OT/PT evals today done.  SLP eval done.  Saliva ok, no drooling.  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

## 2019-12-09 NOTE — PHYSICAL EXAM
[Time] : oriented to time [Place] : oriented to place [Person] : oriented to person [Remote Intact] : remote memory intact [Short Term Intact] : short term memory intact [Concentration Intact] : normal concentrating ability [Registration Intact] : recent registration memory intact [Repeating Phrases] : no difficulty repeating a phrase [Naming Objects] : no difficulty naming common objects [Visual Intact] : visual attention was ~T not ~L decreased [Writing A Sentence] : no difficulty writing a sentence [Fluency] : fluency intact [Comprehension] : comprehension intact [Reading] : reading intact [Past History] : adequate knowledge of personal past history [Cranial Nerves Optic (II)] : visual acuity intact bilaterally,  visual fields full to confrontation, pupils equal round and reactive to light [Cranial Nerves Oculomotor (III)] : extraocular motion intact [Cranial Nerves Trigeminal (V)] : facial sensation intact symmetrically [Cranial Nerves Facial (VII)] : face symmetrical [Cranial Nerves Vestibulocochlear (VIII)] : hearing was intact bilaterally [Cranial Nerves Glossopharyngeal (IX)] : tongue and palate midline [Cranial Nerves Accessory (XI - Cranial And Spinal)] : head turning and shoulder shrug symmetric [Motor Tone] : muscle tone was normal in all four extremities [Motor Handedness Right-Handed] : the patient is right hand dominant [Motor Strength] : muscle strength was normal in all four extremities [5] : ankle plantar flexion 5/5 [Sensation Tactile Decrease] : light touch was intact [Sensation Vibration Decrease] : vibration was intact [Proprioception] : proprioception was intact [Abnormal Walk] : normal gait [Balance] : balance was intact [2+] : Ankle jerk right 2+ [4+] : Patella left 4+ [3+] : Ankle jerk left 3+ [Plantar Reflex Right Only] : abnormal on the right [Hand Weakness Right] : normal hand  [Hand Weakness Left] : normal hand  [Past-pointing] : there was no past-pointing [Tremor] : no tremor present [Plantar Reflex Left Only] : normal on the left [___] : absent on the left [___] : absent on the right [FreeTextEntry5] : tongue fascics noted, no tongue atrophy but very slow movements  [FreeTextEntry6] : mild left scapular winging with mild periscapular atrophy, fascics noted left infraspinatus. PFT's: FVC 5.7, FEV1 3.2 87% predicted\par ALS-FRS 45/48 [FreeTextEntry9] : Right deltoid jerk, and right Negron sign

## 2019-12-10 ENCOUNTER — APPOINTMENT (OUTPATIENT)
Dept: NEUROLOGY | Facility: CLINIC | Age: 54
End: 2019-12-10

## 2019-12-17 ENCOUNTER — TRANSCRIPTION ENCOUNTER (OUTPATIENT)
Age: 54
End: 2019-12-17

## 2020-02-10 ENCOUNTER — APPOINTMENT (OUTPATIENT)
Dept: NEUROLOGY | Facility: CLINIC | Age: 55
End: 2020-02-10
Payer: COMMERCIAL

## 2020-02-10 ENCOUNTER — TRANSCRIPTION ENCOUNTER (OUTPATIENT)
Age: 55
End: 2020-02-10

## 2020-02-10 VITALS
OXYGEN SATURATION: 98 % | TEMPERATURE: 98.5 F | HEIGHT: 68 IN | HEART RATE: 64 BPM | BODY MASS INDEX: 23.19 KG/M2 | WEIGHT: 153 LBS | SYSTOLIC BLOOD PRESSURE: 135 MMHG | DIASTOLIC BLOOD PRESSURE: 81 MMHG

## 2020-02-10 PROCEDURE — 97164 PT RE-EVAL EST PLAN CARE: CPT

## 2020-02-10 PROCEDURE — 99215 OFFICE O/P EST HI 40 MIN: CPT | Mod: 25

## 2020-02-10 PROCEDURE — 97168 OT RE-EVAL EST PLAN CARE: CPT

## 2020-02-10 PROCEDURE — 92610 EVALUATE SWALLOWING FUNCTION: CPT

## 2020-02-11 ENCOUNTER — TRANSCRIPTION ENCOUNTER (OUTPATIENT)
Age: 55
End: 2020-02-11

## 2020-02-13 ENCOUNTER — TRANSCRIPTION ENCOUNTER (OUTPATIENT)
Age: 55
End: 2020-02-13

## 2020-02-13 ENCOUNTER — NON-APPOINTMENT (OUTPATIENT)
Age: 55
End: 2020-02-13

## 2020-02-13 ENCOUNTER — APPOINTMENT (OUTPATIENT)
Dept: INTERNAL MEDICINE | Facility: CLINIC | Age: 55
End: 2020-02-13
Payer: COMMERCIAL

## 2020-02-13 VITALS
TEMPERATURE: 98.6 F | WEIGHT: 150 LBS | HEART RATE: 76 BPM | DIASTOLIC BLOOD PRESSURE: 78 MMHG | SYSTOLIC BLOOD PRESSURE: 112 MMHG | RESPIRATION RATE: 18 BRPM | OXYGEN SATURATION: 97 % | HEIGHT: 68 IN | BODY MASS INDEX: 22.73 KG/M2

## 2020-02-13 DIAGNOSIS — G12.20 MOTOR NEURON DISEASE, UNSPECIFIED: ICD-10-CM

## 2020-02-13 PROCEDURE — 94010 BREATHING CAPACITY TEST: CPT

## 2020-02-13 PROCEDURE — 99214 OFFICE O/P EST MOD 30 MIN: CPT | Mod: 25

## 2020-02-13 NOTE — ADDENDUM
[FreeTextEntry1] : I, Sea Atkinson, acted solely as a scribe for Dr. Jesus Way on this date 02/13/2020.

## 2020-02-13 NOTE — PLAN
[FreeTextEntry1] : 1. Continue with medication outlined above.\par \par 2. Continue respiratory muscle . He will try to increase its use to 2-3 times per day.\par \par 3. Follow up with speech and swallow pathologist for an evaluation and consider institution of a PEG for nutrition.\par \par 4. The patient may need a device to measure his vital capacity and negative inspiratory force in the future.\par \par 5. Follow up with myself in 4 months for a PFT.\par \par 6. Follow up with PCP, Dr. Serrano, for routine evaluation.\par \par 7. Follow up with Dr. Rosario for evaluation of ALS.

## 2020-02-13 NOTE — END OF VISIT
[FreeTextEntry3] : All medical record entries made by the scribe were at my, Dr. Jesus Way, direction and personally dictated by me on 02/13/2020. I have reviewed the chart and agree that the record accurately reflects my personal performance of the history, physical exam, assessment and plan. I have also personally directed, reviewed, and agreed with the chart.

## 2020-02-13 NOTE — PHYSICAL EXAM
[Well Nourished] : well nourished [Supple] : supple [No Respiratory Distress] : no respiratory distress  [Normal Oropharynx] : the oropharynx was normal [Clear to Auscultation] : lungs were clear to auscultation bilaterally [Normal Rate] : normal rate  [Regular Rhythm] : with a regular rhythm [No Edema] : there was no peripheral edema [Normal S1, S2] : normal S1 and S2 [Soft] : abdomen soft [No Extremity Clubbing/Cyanosis] : no extremity clubbing/cyanosis [Normal Supraclavicular Nodes] : no supraclavicular lymphadenopathy [Normal Bowel Sounds] : normal bowel sounds [Normal Posterior Cervical Nodes] : no posterior cervical lymphadenopathy [Normal Anterior Cervical Nodes] : no anterior cervical lymphadenopathy [No CVA Tenderness] : no CVA  tenderness [Grossly Normal Strength/Tone] : grossly normal strength/tone

## 2020-02-18 ENCOUNTER — TRANSCRIPTION ENCOUNTER (OUTPATIENT)
Age: 55
End: 2020-02-18

## 2020-02-20 NOTE — ASSESSMENT
[FreeTextEntry1] : ANATOLIY MORA evaluated by me today  in multidisciplinary ALS clinic; requires evaluations today by PT/OT/speech and swallow therapists.  Social work needs addressed and goals of care reinforced. End of life care including health care proxy and patient wishes discussed.\par \par Nutrition/dietary needs discussed and addressed by me today in the context of his dysphagia (largely oropharyngeal transit due to weak tongue) and inability to eat enough food at each meal; based on his wishes and my assessment it is now appropriate to insert PEG as this will currently augment his nutritional needs.  There is no contraindication to any anesthesia required for PEG placement.    \par \par Pulmonary function assessed by respiratory therapy and spirometry.  Patient’s respiratory function is normal. \par \par Saliva management needs assessed and no current needs\par \par PT/OT recommends: no current needs\par \par Speech/Swallow therapy recommends: PEG \par \par Continue riluzole 50m BID\par \par f/u in 3 months at ALS clinic\par \par

## 2020-02-20 NOTE — PHYSICAL EXAM
[Time] : oriented to time [Place] : oriented to place [Person] : oriented to person [Short Term Intact] : short term memory intact [Remote Intact] : remote memory intact [Visual Intact] : visual attention was ~T not ~L decreased [Registration Intact] : recent registration memory intact [Concentration Intact] : normal concentrating ability [Naming Objects] : no difficulty naming common objects [Repeating Phrases] : no difficulty repeating a phrase [Fluency] : fluency intact [Writing A Sentence] : no difficulty writing a sentence [Reading] : reading intact [Comprehension] : comprehension intact [Cranial Nerves Optic (II)] : visual acuity intact bilaterally,  visual fields full to confrontation, pupils equal round and reactive to light [Past History] : adequate knowledge of personal past history [Cranial Nerves Facial (VII)] : face symmetrical [Cranial Nerves Oculomotor (III)] : extraocular motion intact [Cranial Nerves Trigeminal (V)] : facial sensation intact symmetrically [Cranial Nerves Vestibulocochlear (VIII)] : hearing was intact bilaterally [Cranial Nerves Accessory (XI - Cranial And Spinal)] : head turning and shoulder shrug symmetric [Cranial Nerves Glossopharyngeal (IX)] : tongue and palate midline [Motor Handedness Right-Handed] : the patient is right hand dominant [Motor Strength] : muscle strength was normal in all four extremities [Motor Tone] : muscle tone was normal in all four extremities [Sensation Vibration Decrease] : vibration was intact [5] : ankle plantar flexion 5/5 [Sensation Tactile Decrease] : light touch was intact [Abnormal Walk] : normal gait [Proprioception] : proprioception was intact [Balance] : balance was intact [4+] : Patella left 4+ [3+] : Ankle jerk left 3+ [Plantar Reflex Right Only] : abnormal on the right [2+] : Ankle jerk right 2+ [+4] : arm flexion +4/5 [Hand Weakness Left] : normal hand  [Hand Weakness Right] : normal hand  [Tremor] : no tremor present [Past-pointing] : there was no past-pointing [Plantar Reflex Left Only] : normal on the left [___] : absent on the left [___] : absent on the right [FreeTextEntry9] : Right deltoid jerk, and right Negron sign [FreeTextEntry5] : tongue fascics noted, no tongue atrophy but very slow movements  [FreeTextEntry6] : mild left scapular winging with mild periscapular atrophy, fascics noted left infraspinatus. PFT's: FEV1 3.6 99% predicted\par ALS-FRS 45/48

## 2020-02-20 NOTE — HISTORY OF PRESENT ILLNESS
[FreeTextEntry1] : Having more trouble speaking, very little speech, cannot understand him anymore.  Taking a long time to get meals down, some coughing but no choking.  Has changed consistency of foods.  Breathing is ok, no MEJÍA.  Arm and legs good strength.  \par \par Tolerating riluzole.  They both state that he is having some emotional issues over the disease.  He gets frustrated easily.

## 2020-02-24 ENCOUNTER — TRANSCRIPTION ENCOUNTER (OUTPATIENT)
Age: 55
End: 2020-02-24

## 2020-02-28 ENCOUNTER — APPOINTMENT (OUTPATIENT)
Dept: INTERVENTIONAL RADIOLOGY/VASCULAR | Facility: CLINIC | Age: 55
End: 2020-02-28
Payer: COMMERCIAL

## 2020-02-28 VITALS
OXYGEN SATURATION: 97 % | TEMPERATURE: 98.7 F | DIASTOLIC BLOOD PRESSURE: 93 MMHG | SYSTOLIC BLOOD PRESSURE: 140 MMHG | RESPIRATION RATE: 16 BRPM | HEART RATE: 72 BPM

## 2020-02-28 DIAGNOSIS — Z87.898 PERSONAL HISTORY OF OTHER SPECIFIED CONDITIONS: ICD-10-CM

## 2020-02-28 PROCEDURE — 99243 OFF/OP CNSLTJ NEW/EST LOW 30: CPT

## 2020-03-09 ENCOUNTER — APPOINTMENT (OUTPATIENT)
Dept: NEUROLOGY | Facility: CLINIC | Age: 55
End: 2020-03-09

## 2020-03-12 ENCOUNTER — TRANSCRIPTION ENCOUNTER (OUTPATIENT)
Age: 55
End: 2020-03-12

## 2020-03-16 ENCOUNTER — RX RENEWAL (OUTPATIENT)
Age: 55
End: 2020-03-16

## 2020-03-16 ENCOUNTER — TRANSCRIPTION ENCOUNTER (OUTPATIENT)
Age: 55
End: 2020-03-16

## 2020-03-19 ENCOUNTER — TRANSCRIPTION ENCOUNTER (OUTPATIENT)
Age: 55
End: 2020-03-19

## 2020-05-06 ENCOUNTER — TRANSCRIPTION ENCOUNTER (OUTPATIENT)
Age: 55
End: 2020-05-06

## 2020-05-08 ENCOUNTER — TRANSCRIPTION ENCOUNTER (OUTPATIENT)
Age: 55
End: 2020-05-08

## 2020-05-11 ENCOUNTER — TRANSCRIPTION ENCOUNTER (OUTPATIENT)
Age: 55
End: 2020-05-11

## 2020-05-12 ENCOUNTER — TRANSCRIPTION ENCOUNTER (OUTPATIENT)
Age: 55
End: 2020-05-12

## 2020-05-13 ENCOUNTER — TRANSCRIPTION ENCOUNTER (OUTPATIENT)
Age: 55
End: 2020-05-13

## 2020-05-14 ENCOUNTER — TRANSCRIPTION ENCOUNTER (OUTPATIENT)
Age: 55
End: 2020-05-14

## 2020-05-18 ENCOUNTER — OUTPATIENT (OUTPATIENT)
Dept: OUTPATIENT SERVICES | Facility: HOSPITAL | Age: 55
LOS: 1 days | End: 2020-05-18
Payer: COMMERCIAL

## 2020-05-18 DIAGNOSIS — Z98.890 OTHER SPECIFIED POSTPROCEDURAL STATES: Chronic | ICD-10-CM

## 2020-05-18 DIAGNOSIS — Z11.59 ENCOUNTER FOR SCREENING FOR OTHER VIRAL DISEASES: ICD-10-CM

## 2020-05-18 LAB — SARS-COV-2 RNA SPEC QL NAA+PROBE: SIGNIFICANT CHANGE UP

## 2020-05-18 PROCEDURE — U0003: CPT

## 2020-05-20 ENCOUNTER — OUTPATIENT (OUTPATIENT)
Dept: INPATIENT UNIT | Facility: HOSPITAL | Age: 55
LOS: 1 days | End: 2020-05-20
Payer: COMMERCIAL

## 2020-05-20 ENCOUNTER — FORM ENCOUNTER (OUTPATIENT)
Age: 55
End: 2020-05-20

## 2020-05-20 DIAGNOSIS — R13.10 DYSPHAGIA, UNSPECIFIED: ICD-10-CM

## 2020-05-20 DIAGNOSIS — G12.21 AMYOTROPHIC LATERAL SCLEROSIS: ICD-10-CM

## 2020-05-20 DIAGNOSIS — Z98.890 OTHER SPECIFIED POSTPROCEDURAL STATES: Chronic | ICD-10-CM

## 2020-05-20 LAB
ANION GAP SERPL CALC-SCNC: 13 MMOL/L — SIGNIFICANT CHANGE UP (ref 5–17)
BLD GP AB SCN SERPL QL: NEGATIVE — SIGNIFICANT CHANGE UP
BUN SERPL-MCNC: 21 MG/DL — SIGNIFICANT CHANGE UP (ref 7–23)
CALCIUM SERPL-MCNC: 9.8 MG/DL — SIGNIFICANT CHANGE UP (ref 8.4–10.5)
CHLORIDE SERPL-SCNC: 104 MMOL/L — SIGNIFICANT CHANGE UP (ref 96–108)
CO2 SERPL-SCNC: 28 MMOL/L — SIGNIFICANT CHANGE UP (ref 22–31)
CREAT SERPL-MCNC: 0.97 MG/DL — SIGNIFICANT CHANGE UP (ref 0.5–1.3)
GLUCOSE SERPL-MCNC: 112 MG/DL — HIGH (ref 70–99)
HCT VFR BLD CALC: 44.8 % — SIGNIFICANT CHANGE UP (ref 39–50)
HGB BLD-MCNC: 14.8 G/DL — SIGNIFICANT CHANGE UP (ref 13–17)
INR BLD: 0.95 RATIO — SIGNIFICANT CHANGE UP (ref 0.88–1.16)
MCHC RBC-ENTMCNC: 31 PG — SIGNIFICANT CHANGE UP (ref 27–34)
MCHC RBC-ENTMCNC: 33 GM/DL — SIGNIFICANT CHANGE UP (ref 32–36)
MCV RBC AUTO: 93.9 FL — SIGNIFICANT CHANGE UP (ref 80–100)
NRBC # BLD: 0 /100 WBCS — SIGNIFICANT CHANGE UP (ref 0–0)
PLATELET # BLD AUTO: 242 K/UL — SIGNIFICANT CHANGE UP (ref 150–400)
POTASSIUM SERPL-MCNC: 3.8 MMOL/L — SIGNIFICANT CHANGE UP (ref 3.5–5.3)
POTASSIUM SERPL-SCNC: 3.8 MMOL/L — SIGNIFICANT CHANGE UP (ref 3.5–5.3)
PROTHROM AB SERPL-ACNC: 10.9 SEC — SIGNIFICANT CHANGE UP (ref 10–12.9)
RBC # BLD: 4.77 M/UL — SIGNIFICANT CHANGE UP (ref 4.2–5.8)
RBC # FLD: 12.4 % — SIGNIFICANT CHANGE UP (ref 10.3–14.5)
RH IG SCN BLD-IMP: POSITIVE — SIGNIFICANT CHANGE UP
RH IG SCN BLD-IMP: POSITIVE — SIGNIFICANT CHANGE UP
SODIUM SERPL-SCNC: 145 MMOL/L — SIGNIFICANT CHANGE UP (ref 135–145)
WBC # BLD: 9.01 K/UL — SIGNIFICANT CHANGE UP (ref 3.8–10.5)
WBC # FLD AUTO: 9.01 K/UL — SIGNIFICANT CHANGE UP (ref 3.8–10.5)

## 2020-05-20 PROCEDURE — 86901 BLOOD TYPING SEROLOGIC RH(D): CPT

## 2020-05-20 PROCEDURE — 80048 BASIC METABOLIC PNL TOTAL CA: CPT

## 2020-05-20 PROCEDURE — 85610 PROTHROMBIN TIME: CPT

## 2020-05-20 PROCEDURE — 86850 RBC ANTIBODY SCREEN: CPT

## 2020-05-20 PROCEDURE — C1892: CPT

## 2020-05-20 PROCEDURE — C1887: CPT

## 2020-05-20 PROCEDURE — C1894: CPT

## 2020-05-20 PROCEDURE — C1769: CPT

## 2020-05-20 PROCEDURE — 85027 COMPLETE CBC AUTOMATED: CPT

## 2020-05-20 PROCEDURE — 49440 PLACE GASTROSTOMY TUBE PERC: CPT

## 2020-05-20 PROCEDURE — 86900 BLOOD TYPING SEROLOGIC ABO: CPT

## 2020-05-20 PROCEDURE — L8699: CPT

## 2020-05-20 RX ORDER — GLUCAGON INJECTION, SOLUTION 0.5 MG/.1ML
1 INJECTION, SOLUTION SUBCUTANEOUS ONCE
Refills: 0 | Status: DISCONTINUED | OUTPATIENT
Start: 2020-05-20 | End: 2020-05-20

## 2020-05-21 ENCOUNTER — APPOINTMENT (OUTPATIENT)
Dept: INTERVENTIONAL RADIOLOGY/VASCULAR | Facility: CLINIC | Age: 55
End: 2020-05-21
Payer: COMMERCIAL

## 2020-05-21 PROCEDURE — G2012 BRIEF CHECK IN BY MD/QHP: CPT

## 2020-05-27 ENCOUNTER — TRANSCRIPTION ENCOUNTER (OUTPATIENT)
Age: 55
End: 2020-05-27

## 2020-06-10 ENCOUNTER — TRANSCRIPTION ENCOUNTER (OUTPATIENT)
Age: 55
End: 2020-06-10

## 2020-06-18 ENCOUNTER — APPOINTMENT (OUTPATIENT)
Dept: INTERNAL MEDICINE | Facility: CLINIC | Age: 55
End: 2020-06-18
Payer: COMMERCIAL

## 2020-06-18 VITALS
HEART RATE: 83 BPM | TEMPERATURE: 98.1 F | OXYGEN SATURATION: 96 % | SYSTOLIC BLOOD PRESSURE: 112 MMHG | RESPIRATION RATE: 18 BRPM | WEIGHT: 145 LBS | BODY MASS INDEX: 21.98 KG/M2 | DIASTOLIC BLOOD PRESSURE: 82 MMHG | HEIGHT: 68 IN

## 2020-06-18 DIAGNOSIS — F32.9 MAJOR DEPRESSIVE DISORDER, SINGLE EPISODE, UNSPECIFIED: ICD-10-CM

## 2020-06-18 DIAGNOSIS — R47.9 UNSPECIFIED SPEECH DISTURBANCES: ICD-10-CM

## 2020-06-18 DIAGNOSIS — R13.10 DYSPHAGIA, UNSPECIFIED: ICD-10-CM

## 2020-06-18 PROCEDURE — 99214 OFFICE O/P EST MOD 30 MIN: CPT

## 2020-06-18 NOTE — HISTORY OF PRESENT ILLNESS
[de-identified] : Patient comes in today for a followup evaluation, and reassessment of his pulmonary status.\par \par Unfortunately, the patient continues to progress and decline, with regards to his ALS. He notes that his muscle weakness is worsening. His left leg is weaker. His balance has been worsening as well. He was unable to swallow food, and eventually, a percutaneous gastrostomy was placed on 5/20/20. He is now receiving 1000 calories via the PEG tube, 3 times a day. He is tolerating the tube feeds relatively well. He has normal bowel movements.\par \par With regards to his pulmonary status, he denies any overt dyspnea. He stopped his exercise regimen due to his feelings of depression and futility. He is not using his respiratory muscle . He notes that he has a difficult time placing a seal on the device. He denies any sputum production. He denies any chest pain. He denies daytime somnolence.\par \par The patient notes that he is extremely depressed. He was given the names of several therapists. He has not yet begun therapy. He would also like to consider an antidepressant as well as to possibly see a psychiatrist. He now comes in for assessment

## 2020-06-18 NOTE — PLAN
[FreeTextEntry1] : 1. Continue with observation, with regards to his pulmonary status. At this time, the patient did not think he would like to be placed on long-term mechanical ventilation. To this end, we will hold off on performing an arterial blood gas at this time. We will consider this in the future if he deteriorates and changes his mind about the ventilatory support.\par \par 2. The patient has been encouraged to attempt to use the respiratory muscle  if possible\par \par 3. Maintain nutrition via PEG tube\par \par 4. Followup in 3 months with office visit and spirometry if possible. Will consider an arterial blood gas at that time if the patient is looking for long-term intervention.\par \par 5. The patient will contact his primary care physician, or his neurologist regarding the institution of an antidepressant. He will also begin to see a therapist in the very near future.

## 2020-06-18 NOTE — PHYSICAL EXAM
[No Respiratory Distress] : no respiratory distress  [Supple] : supple [Clear to Auscultation] : lungs were clear to auscultation bilaterally [Normal Rate] : normal rate  [Regular Rhythm] : with a regular rhythm [Normal S1, S2] : normal S1 and S2 [No Edema] : there was no peripheral edema [No Extremity Clubbing/Cyanosis] : no extremity clubbing/cyanosis [Soft] : abdomen soft [Normal Bowel Sounds] : normal bowel sounds [Normal Supraclavicular Nodes] : no supraclavicular lymphadenopathy [Normal Anterior Cervical Nodes] : no anterior cervical lymphadenopathy [Normal Posterior Cervical Nodes] : no posterior cervical lymphadenopathy [No CVA Tenderness] : no CVA  tenderness [Grossly Normal Strength/Tone] : grossly normal strength/tone [de-identified] : Thin white male [de-identified] :  PEG tube is in place [de-identified] : Depressed mood

## 2020-06-19 ENCOUNTER — TRANSCRIPTION ENCOUNTER (OUTPATIENT)
Age: 55
End: 2020-06-19

## 2020-06-25 ENCOUNTER — APPOINTMENT (OUTPATIENT)
Dept: INTERNAL MEDICINE | Facility: CLINIC | Age: 55
End: 2020-06-25

## 2020-06-27 ENCOUNTER — TRANSCRIPTION ENCOUNTER (OUTPATIENT)
Age: 55
End: 2020-06-27

## 2020-07-13 ENCOUNTER — APPOINTMENT (OUTPATIENT)
Dept: NEUROLOGY | Facility: CLINIC | Age: 55
End: 2020-07-13
Payer: COMMERCIAL

## 2020-07-13 VITALS
BODY MASS INDEX: 23.64 KG/M2 | HEART RATE: 84 BPM | HEIGHT: 68 IN | DIASTOLIC BLOOD PRESSURE: 82 MMHG | WEIGHT: 156 LBS | SYSTOLIC BLOOD PRESSURE: 122 MMHG

## 2020-07-13 VITALS — TEMPERATURE: 98 F

## 2020-07-13 PROCEDURE — 97164 PT RE-EVAL EST PLAN CARE: CPT

## 2020-07-13 PROCEDURE — 97168 OT RE-EVAL EST PLAN CARE: CPT

## 2020-07-13 PROCEDURE — 92610 EVALUATE SWALLOWING FUNCTION: CPT

## 2020-07-13 PROCEDURE — 99215 OFFICE O/P EST HI 40 MIN: CPT | Mod: 25

## 2020-07-13 NOTE — HISTORY OF PRESENT ILLNESS
[FreeTextEntry1] : Patient indicates via his phone luis carlos that his right hand is a bit weaker and some LLE proximal weakness. \par \par Using PEG and no PO intake, using 6-7 [employer] 1.5 formulas daily.  c/o fatigue.  No SOB. No muscle cramps.  Getting about 8-10 hrs sleep per night, mirtazapine 15mg makes him drowsy.  \par \par Genetics: TBK1 mutation.\par \par He gets dry mouth and coughs up mucous.

## 2020-07-13 NOTE — REASON FOR VISIT
[Follow-Up: _____] : a [unfilled] follow-up visit [Speech and Language] : speech and language evaluation [Initial] : initial visit for [Clinical Swallow] : clinical swallow evaluation [Spouse] : spouse

## 2020-07-13 NOTE — PHYSICAL EXAM
[Person] : oriented to person [Place] : oriented to place [Time] : oriented to time [Short Term Intact] : short term memory intact [Remote Intact] : remote memory intact [Concentration Intact] : normal concentrating ability [Registration Intact] : recent registration memory intact [Visual Intact] : visual attention was ~T not ~L decreased [Repeating Phrases] : no difficulty repeating a phrase [Naming Objects] : no difficulty naming common objects [Writing A Sentence] : no difficulty writing a sentence [Fluency] : fluency intact [Comprehension] : comprehension intact [Reading] : reading intact [Past History] : adequate knowledge of personal past history [Cranial Nerves Optic (II)] : visual acuity intact bilaterally,  visual fields full to confrontation, pupils equal round and reactive to light [Cranial Nerves Facial (VII)] : face symmetrical [Cranial Nerves Oculomotor (III)] : extraocular motion intact [Cranial Nerves Trigeminal (V)] : facial sensation intact symmetrically [Cranial Nerves Glossopharyngeal (IX)] : tongue and palate midline [Cranial Nerves Vestibulocochlear (VIII)] : hearing was intact bilaterally [Cranial Nerves Accessory (XI - Cranial And Spinal)] : head turning and shoulder shrug symmetric [Motor Tone] : muscle tone was normal in all four extremities [Motor Handedness Right-Handed] : the patient is right hand dominant [Hand Weakness Right] : the hand  was weak [+4] : knee flexion +4/5 [5] : ankle plantar flexion 5/5 [Sensation Tactile Decrease] : light touch was intact [Sensation Vibration Decrease] : vibration was intact [Proprioception] : proprioception was intact [Abnormal Walk] : normal gait [Balance] : balance was intact [4+] : Ankle jerk right 4+ [3+] : Ankle jerk left 3+ [Plantar Reflex Right Only] : abnormal on the right [___] : [unfilled] ~Ubeats present on the right [Hand Weakness Left] : normal hand  [Past-pointing] : there was no past-pointing [Tremor] : no tremor present [Plantar Reflex Left Only] : normal on the left [___] : absent on the left [FreeTextEntry5] : tongue fascics noted, no tongue atrophy but very slow movements  [FreeTextEntry9] : Right deltoid jerk, and right Negron sign [FreeTextEntry6] : mild left scapular winging with mild periscapular atrophy, fascics noted left infraspinatus. PFT's: FEV1 3.6 99% predicted\par ALS-FRS 36/48

## 2020-07-13 NOTE — ASSESSMENT
[FreeTextEntry1] : Patient was alert and cooperative for today's ALS clinic. Accompanied by wife. Patient and wife reported progressive decline in swallow function resulting in patient currently relying on PEG tube feeds as primary source of nutrition/hydration/medication with occasional PO supplementation of purees and thin liquids. Patient denied recent history of pneumonia and URI symptoms. In regards to communication, patient continues to rely on [id number] application on iPhone. He continues to express disinterest in AAC evaluation despite clinician encouragement.\par \par Oroperipheral examination: Moderately decreased lingual strength, range and coordination noted upon lingual protrusion, elevation and lateralization. Moderately decreased labial strength, range and coordination noted upon labial protrusion/retraction. Mildly reduced oral aperture and mandibular range of motion noted. Upper airway sounds were clear at baseline. The patient’s volitional swallow was delayed and effortful yet complete. Volitional cough was weak. \par \par Speech/Voice: Overall speech intelligibility was judged to be poor at the single word level to both familiar and unfamiliar listener. Vocal quality was perceived to be hoarse and strained upon limited vocalizations. Maximum phonation time was judged to be reduced for the patient's age/gender.\par \par Language: Expressive and receptive language abilities were judged to be grossly within functional limits given patient's ability to respond appropriately to simple wh- questions, follow 2-step directives and communicate simple wants/needs/thoughts/ideas via [id number] iPhone application and gestures.\par \par Oral Feeding Assessment: The patient was given PO trials of puree and thin liquids via small, single cup sips. Oral Stage was characterized by weak labial seal with subsequent trace anterior spillage and slow/weak lingual motion with prolonged bolus collection, manipulation and transfer. Pharyngeal Stage was characterized by a suspected delay in swallow trigger and palpable laryngeal elevation with no overt, clinical s/s of laryngeal penetration/aspiration. \par \par Impressions: \par 1)	Moderate Oral Dysphagia; Mild Pharyngeal Dysphagia \par 2)	Severe Dysarthria\par \par Recommendations:\par 1)	Continue Non-Oral Means of Nutrition/Hydration/Medication via PEG tube feeds; Allow PO supplementation of puree and thin liquids via small amounts / single sips\par 2)	Maintain Aspiration Precautions and Meticulous Oral Hygiene to minimize risk for aspiration of oral pathogens\par 3)	The patient was encouraged to follow up with ALS clinic as directed by MD\par \par The above recommendations were discussed at length with the patient and his wife at which time full understanding was demonstrated. Candidacy for an AAC device was again discussed given the severity of patient’s dysarthria, however patient continues to decline evaluation.\par \par Eula Hamilton MS, CCC-SLP\par Speech-Language Pathologist \par

## 2020-07-13 NOTE — ASSESSMENT
[FreeTextEntry1] : Patient was alert and cooperative for today's ALS clinic. Accompanied by wife. Patient and wife reported progressive decline in swallow function resulting in patient currently relying on PEG tube feeds as primary source of nutrition/hydration/medication with occasional PO supplementation of purees and thin liquids. Patient denied recent history of pneumonia and URI symptoms. In regards to communication, patient continues to rely on [id number] application on iPhone. He continues to express disinterest in AAC evaluation despite clinician encouragement.\par \par Oroperipheral examination: Moderately decreased lingual strength, range and coordination noted upon lingual protrusion, elevation and lateralization. Moderately decreased labial strength, range and coordination noted upon labial protrusion/retraction. Mildly reduced oral aperture and mandibular range of motion noted. Upper airway sounds were clear at baseline. The patient’s volitional swallow was delayed and effortful yet complete. Volitional cough was weak. \par \par Speech/Voice: Overall speech intelligibility was judged to be poor at the single word level to both familiar and unfamiliar listener. Vocal quality was perceived to be hoarse and strained upon limited vocalizations. Maximum phonation time was judged to be reduced for the patient's age/gender.\par \par Language: Expressive and receptive language abilities were judged to be grossly within functional limits given patient's ability to respond appropriately to simple wh- questions, follow 2-step directives and communicate simple wants/needs/thoughts/ideas via [id number] iPhone application and gestures.\par \par Oral Feeding Assessment: The patient was given PO trials of puree and thin liquids via small, single cup sips. Oral Stage was characterized by weak labial seal with subsequent trace anterior spillage and slow/weak lingual motion with prolonged bolus collection, manipulation and transfer. Pharyngeal Stage was characterized by a suspected delay in swallow trigger and palpable laryngeal elevation with no overt, clinical s/s of laryngeal penetration/aspiration. \par \par Impressions: \par 1)	Moderate Oral Dysphagia; Mild Pharyngeal Dysphagia \par 2)	Severe Dysarthria\par \par Recommendations:\par 1)	Continue Non-Oral Means of Nutrition/Hydration/Medication via PEG tube feeds; Allow PO supplementation of puree and thin liquids via small amounts / single sips\par 2)	Maintain Aspiration Precautions and Meticulous Oral Hygiene to minimize risk for aspiration of oral pathogens\par 3)	The patient was encouraged to follow up with ALS clinic as directed by MD\par \par The above recommendations were discussed at length with the patient and his wife at which time full understanding was demonstrated. Candidacy for an AAC device was again discussed given the severity of patient’s dysarthria, however patient continues to decline evaluation.\par \par Eula Hamilton MS, CCC-SLP\par Speech-Language Pathologist \par

## 2020-07-13 NOTE — PHYSICAL EXAM
[Person] : oriented to person [Place] : oriented to place [Time] : oriented to time [Remote Intact] : remote memory intact [Short Term Intact] : short term memory intact [Registration Intact] : recent registration memory intact [Concentration Intact] : normal concentrating ability [Visual Intact] : visual attention was ~T not ~L decreased [Naming Objects] : no difficulty naming common objects [Repeating Phrases] : no difficulty repeating a phrase [Fluency] : fluency intact [Comprehension] : comprehension intact [Writing A Sentence] : no difficulty writing a sentence [Reading] : reading intact [Cranial Nerves Optic (II)] : visual acuity intact bilaterally,  visual fields full to confrontation, pupils equal round and reactive to light [Past History] : adequate knowledge of personal past history [Cranial Nerves Trigeminal (V)] : facial sensation intact symmetrically [Cranial Nerves Oculomotor (III)] : extraocular motion intact [Cranial Nerves Facial (VII)] : face symmetrical [Cranial Nerves Vestibulocochlear (VIII)] : hearing was intact bilaterally [Cranial Nerves Glossopharyngeal (IX)] : tongue and palate midline [Cranial Nerves Accessory (XI - Cranial And Spinal)] : head turning and shoulder shrug symmetric [Motor Tone] : muscle tone was normal in all four extremities [Motor Handedness Right-Handed] : the patient is right hand dominant [Hand Weakness Right] : the hand  was weak [+4] : knee flexion +4/5 [5] : ankle plantar flexion 5/5 [Sensation Tactile Decrease] : light touch was intact [Sensation Vibration Decrease] : vibration was intact [Proprioception] : proprioception was intact [Abnormal Walk] : normal gait [Balance] : balance was intact [4+] : Ankle jerk right 4+ [3+] : Ankle jerk left 3+ [Plantar Reflex Right Only] : abnormal on the right [___] : [unfilled] ~Ubeats present on the right [Hand Weakness Left] : normal hand  [Past-pointing] : there was no past-pointing [Tremor] : no tremor present [___] : absent on the left [Plantar Reflex Left Only] : normal on the left [FreeTextEntry5] : tongue fascics noted, no tongue atrophy but very slow movements  [FreeTextEntry6] : mild left scapular winging with mild periscapular atrophy, fascics noted left infraspinatus. PFT's: FEV1 3.6 99% predicted\par ALS-FRS 36/48 [FreeTextEntry9] : Right deltoid jerk, and right Negron sign

## 2020-07-13 NOTE — REASON FOR VISIT
[Follow-Up: _____] : a [unfilled] follow-up visit [Speech and Language] : speech and language evaluation [Clinical Swallow] : clinical swallow evaluation [Initial] : initial visit for [Spouse] : spouse

## 2020-08-12 ENCOUNTER — TRANSCRIPTION ENCOUNTER (OUTPATIENT)
Age: 55
End: 2020-08-12

## 2020-08-13 ENCOUNTER — TRANSCRIPTION ENCOUNTER (OUTPATIENT)
Age: 55
End: 2020-08-13

## 2020-08-14 ENCOUNTER — TRANSCRIPTION ENCOUNTER (OUTPATIENT)
Age: 55
End: 2020-08-14

## 2020-08-17 ENCOUNTER — TRANSCRIPTION ENCOUNTER (OUTPATIENT)
Age: 55
End: 2020-08-17

## 2020-08-31 DIAGNOSIS — Z11.59 ENCOUNTER FOR SCREENING FOR OTHER VIRAL DISEASES: ICD-10-CM

## 2020-09-08 ENCOUNTER — APPOINTMENT (OUTPATIENT)
Dept: INTERNAL MEDICINE | Facility: CLINIC | Age: 55
End: 2020-09-08

## 2020-09-21 ENCOUNTER — RX RENEWAL (OUTPATIENT)
Age: 55
End: 2020-09-21

## 2020-09-23 ENCOUNTER — TRANSCRIPTION ENCOUNTER (OUTPATIENT)
Age: 55
End: 2020-09-23

## 2020-10-23 PROBLEM — Z87.09 PERSONAL HISTORY OF OTHER DISEASES OF THE RESPIRATORY SYSTEM: Chronic | Status: ACTIVE | Noted: 2020-05-14

## 2020-10-23 PROBLEM — Z87.898 PERSONAL HISTORY OF OTHER SPECIFIED CONDITIONS: Chronic | Status: ACTIVE | Noted: 2020-05-14

## 2020-10-23 PROBLEM — G12.21 AMYOTROPHIC LATERAL SCLEROSIS: Chronic | Status: ACTIVE | Noted: 2020-05-14

## 2020-10-23 PROBLEM — Z87.19 PERSONAL HISTORY OF OTHER DISEASES OF THE DIGESTIVE SYSTEM: Chronic | Status: ACTIVE | Noted: 2020-05-14

## 2020-10-25 ENCOUNTER — OUTPATIENT (OUTPATIENT)
Dept: OUTPATIENT SERVICES | Facility: HOSPITAL | Age: 55
LOS: 1 days | End: 2020-10-25
Payer: COMMERCIAL

## 2020-10-25 DIAGNOSIS — Z11.59 ENCOUNTER FOR SCREENING FOR OTHER VIRAL DISEASES: ICD-10-CM

## 2020-10-25 DIAGNOSIS — Z98.890 OTHER SPECIFIED POSTPROCEDURAL STATES: Chronic | ICD-10-CM

## 2020-10-25 PROCEDURE — U0003: CPT

## 2020-10-26 DIAGNOSIS — Z11.59 ENCOUNTER FOR SCREENING FOR OTHER VIRAL DISEASES: ICD-10-CM

## 2020-10-26 LAB — SARS-COV-2 RNA SPEC QL NAA+PROBE: SIGNIFICANT CHANGE UP

## 2020-10-28 ENCOUNTER — OUTPATIENT (OUTPATIENT)
Dept: INPATIENT UNIT | Facility: HOSPITAL | Age: 55
LOS: 1 days | Discharge: HOME | End: 2020-10-28
Payer: COMMERCIAL

## 2020-10-28 ENCOUNTER — RESULT REVIEW (OUTPATIENT)
Age: 55
End: 2020-10-28

## 2020-10-28 VITALS
DIASTOLIC BLOOD PRESSURE: 88 MMHG | RESPIRATION RATE: 16 BRPM | HEART RATE: 74 BPM | TEMPERATURE: 97 F | SYSTOLIC BLOOD PRESSURE: 130 MMHG | OXYGEN SATURATION: 97 %

## 2020-10-28 VITALS
HEIGHT: 68.5 IN | OXYGEN SATURATION: 97 % | WEIGHT: 154.98 LBS | SYSTOLIC BLOOD PRESSURE: 124 MMHG | TEMPERATURE: 98 F | DIASTOLIC BLOOD PRESSURE: 95 MMHG | RESPIRATION RATE: 16 BRPM | HEART RATE: 73 BPM

## 2020-10-28 DIAGNOSIS — Z98.890 OTHER SPECIFIED POSTPROCEDURAL STATES: Chronic | ICD-10-CM

## 2020-10-28 DIAGNOSIS — R13.14 DYSPHAGIA, PHARYNGOESOPHAGEAL PHASE: ICD-10-CM

## 2020-10-28 DIAGNOSIS — Z93.1 GASTROSTOMY STATUS: Chronic | ICD-10-CM

## 2020-10-28 LAB
ANION GAP SERPL CALC-SCNC: 3 MMOL/L — LOW (ref 5–17)
BUN SERPL-MCNC: 21 MG/DL — SIGNIFICANT CHANGE UP (ref 7–23)
CALCIUM SERPL-MCNC: 9.4 MG/DL — SIGNIFICANT CHANGE UP (ref 8.5–10.1)
CHLORIDE SERPL-SCNC: 109 MMOL/L — HIGH (ref 96–108)
CO2 SERPL-SCNC: 31 MMOL/L — SIGNIFICANT CHANGE UP (ref 22–31)
CREAT SERPL-MCNC: 1.04 MG/DL — SIGNIFICANT CHANGE UP (ref 0.5–1.3)
GLUCOSE SERPL-MCNC: 91 MG/DL — SIGNIFICANT CHANGE UP (ref 70–99)
HCT VFR BLD CALC: 43.3 % — SIGNIFICANT CHANGE UP (ref 39–50)
HGB BLD-MCNC: 14.9 G/DL — SIGNIFICANT CHANGE UP (ref 13–17)
INR BLD: 1.03 RATIO — SIGNIFICANT CHANGE UP (ref 0.88–1.16)
MCHC RBC-ENTMCNC: 31.4 PG — SIGNIFICANT CHANGE UP (ref 27–34)
MCHC RBC-ENTMCNC: 34.4 GM/DL — SIGNIFICANT CHANGE UP (ref 32–36)
MCV RBC AUTO: 91.2 FL — SIGNIFICANT CHANGE UP (ref 80–100)
PLATELET # BLD AUTO: 186 K/UL — SIGNIFICANT CHANGE UP (ref 150–400)
POTASSIUM SERPL-MCNC: 4.2 MMOL/L — SIGNIFICANT CHANGE UP (ref 3.5–5.3)
POTASSIUM SERPL-SCNC: 4.2 MMOL/L — SIGNIFICANT CHANGE UP (ref 3.5–5.3)
PROTHROM AB SERPL-ACNC: 12 SEC — SIGNIFICANT CHANGE UP (ref 10.6–13.6)
RBC # BLD: 4.75 M/UL — SIGNIFICANT CHANGE UP (ref 4.2–5.8)
RBC # FLD: 12.5 % — SIGNIFICANT CHANGE UP (ref 10.3–14.5)
SODIUM SERPL-SCNC: 143 MMOL/L — SIGNIFICANT CHANGE UP (ref 135–145)
WBC # BLD: 6.02 K/UL — SIGNIFICANT CHANGE UP (ref 3.8–10.5)
WBC # FLD AUTO: 6.02 K/UL — SIGNIFICANT CHANGE UP (ref 3.8–10.5)

## 2020-10-28 PROCEDURE — 85027 COMPLETE CBC AUTOMATED: CPT

## 2020-10-28 PROCEDURE — 36415 COLL VENOUS BLD VENIPUNCTURE: CPT

## 2020-10-28 PROCEDURE — 49450 REPLACE G/C TUBE PERC: CPT

## 2020-10-28 PROCEDURE — 80048 BASIC METABOLIC PNL TOTAL CA: CPT

## 2020-10-28 PROCEDURE — 85610 PROTHROMBIN TIME: CPT

## 2020-10-28 PROCEDURE — L8699: CPT

## 2020-10-28 PROCEDURE — 93010 ELECTROCARDIOGRAM REPORT: CPT

## 2020-10-28 PROCEDURE — 93005 ELECTROCARDIOGRAM TRACING: CPT

## 2020-10-28 NOTE — ASU DISCHARGE PLAN (ADULT/PEDIATRIC) - NURSING INSTRUCTIONS
Begin with liquids and light food ( tea, toast, Jello, soups). Advance to what you normally eat. Liquids should taken in adequate amounts today.     CALL the DOCTOR:    -Fever greater than  101F  - Signs  of infection such as : increase pain,swelling,redness,or a bad  smell coming from the wound.  -Excessive amount of bleeding.  - Any pain that appears to be getting worse.  - Vomiting  -  If you have  not urinated 8 hours after surgery or have any difficulty urinating.     A responsible adult should be with you for the rest of the day and night for your safety and to help you if you needed.    Review attached FACT SHEET if applicable. Begin with liquids and light food ( tea, toast, Jello, soups). Advance to what you normally eat. Liquids should taken in adequate amounts today.     CALL the DOCTOR:    -Fever greater than  101F  - Signs  of infection such as : increase pain,swelling,redness,or a bad  smell coming from the wound.  -Excessive amount of bleeding.  - Any pain that appears to be getting worse.  - Vomiting  -  If you have  not urinated 8 hours after surgery or have any difficulty urinating.     A responsible adult should be with you for the rest of the day and night for your safety and to help you if       A responsible adult should be with you for the rest of the day and night for your safety and to help you if you needed.    Review attached FACT SHEET if applicable.

## 2020-10-28 NOTE — ASU PREOP CHECKLIST - DENTURES
Detail Level: Detailed Post-Care Instructions: I reviewed with the patient in detail post-care instructions. Patient is to wear sunprotection, and avoid picking at any of the treated lesions. Pt may apply Vaseline to crusted or scabbing areas. Duration Of Freeze Thaw-Cycle (Seconds): 5 Render Note In Bullet Format When Appropriate: No Render Post-Care Instructions In Note?: yes Consent: The patient's consent was obtained including but not limited to risks of crusting, scabbing, blistering, scarring, darker or lighter pigmentary change, recurrence, incomplete removal and infection. Medical Necessity Information: It is in your best interest to select a reason for this procedure from the list below. All of these items fulfill various CMS LCD requirements except the new and changing color options. Medical Necessity Clause: This procedure was medically necessary because the lesions that were treated were: no

## 2020-10-28 NOTE — ASU PATIENT PROFILE, ADULT - FALL HARM RISK CONCLUSION
Fall with Harm Risk V-Y Flap Text: The defect edges were debeveled with a #15 scalpel blade.  Given the location of the defect, shape of the defect and the proximity to free margins a V-Y flap was deemed most appropriate.  Using a sterile surgical marker, an appropriate advancement flap was drawn incorporating the defect and placing the expected incisions within the relaxed skin tension lines where possible.    The area thus outlined was incised deep to adipose tissue with a #15 scalpel blade.  The skin margins were undermined to an appropriate distance in all directions utilizing iris scissors.

## 2020-10-28 NOTE — ASU PREOP CHECKLIST - LOOSE TEETH
----- Message from Yong Retana DO sent at 11/15/2018  4:22 PM CST -----  She has an infection trichomonas. Please Rx metronidazole 500 mg tabs, take 4 tablets once. Sexual partner should also be treated. She needs to follow up with PCP in 2-3 weeks for a test of cure. no

## 2020-10-28 NOTE — ASU PATIENT PROFILE, ADULT - PMH
H/O bronchiectasis    H/O solitary pulmonary nodule  RLL  History of dysphagia  Hx of aspiration PNA 3/2019  IBALS (isolated bulbar amyotrophic lateral sclerosis)  Pt is nonverbal, communicates with phone luis carlos/writing.  Has 5/5 strength in all extremeties

## 2020-11-03 DIAGNOSIS — Z43.1 ENCOUNTER FOR ATTENTION TO GASTROSTOMY: ICD-10-CM

## 2020-11-03 DIAGNOSIS — R13.10 DYSPHAGIA, UNSPECIFIED: ICD-10-CM

## 2020-11-03 DIAGNOSIS — G12.21 AMYOTROPHIC LATERAL SCLEROSIS: ICD-10-CM

## 2020-12-14 ENCOUNTER — TRANSCRIPTION ENCOUNTER (OUTPATIENT)
Age: 55
End: 2020-12-14

## 2020-12-14 ENCOUNTER — APPOINTMENT (OUTPATIENT)
Dept: NEUROLOGY | Facility: CLINIC | Age: 55
End: 2020-12-14

## 2020-12-15 ENCOUNTER — TRANSCRIPTION ENCOUNTER (OUTPATIENT)
Age: 55
End: 2020-12-15

## 2020-12-28 ENCOUNTER — TRANSCRIPTION ENCOUNTER (OUTPATIENT)
Age: 55
End: 2020-12-28

## 2021-01-04 ENCOUNTER — TRANSCRIPTION ENCOUNTER (OUTPATIENT)
Age: 56
End: 2021-01-04

## 2021-01-05 ENCOUNTER — TRANSCRIPTION ENCOUNTER (OUTPATIENT)
Age: 56
End: 2021-01-05

## 2021-01-11 ENCOUNTER — APPOINTMENT (OUTPATIENT)
Dept: NEUROLOGY | Facility: CLINIC | Age: 56
End: 2021-01-11
Payer: COMMERCIAL

## 2021-01-11 VITALS
SYSTOLIC BLOOD PRESSURE: 131 MMHG | BODY MASS INDEX: 23.64 KG/M2 | DIASTOLIC BLOOD PRESSURE: 82 MMHG | WEIGHT: 156 LBS | HEIGHT: 68 IN

## 2021-01-11 VITALS — TEMPERATURE: 97.3 F

## 2021-01-11 PROCEDURE — 92610 EVALUATE SWALLOWING FUNCTION: CPT

## 2021-01-11 PROCEDURE — 99215 OFFICE O/P EST HI 40 MIN: CPT | Mod: 25

## 2021-01-11 PROCEDURE — 97166 OT EVAL MOD COMPLEX 45 MIN: CPT | Mod: GO,GP

## 2021-01-11 PROCEDURE — 97162 PT EVAL MOD COMPLEX 30 MIN: CPT | Mod: GP

## 2021-01-11 RX ORDER — RILUZOLE 50 MG/1
50 TABLET, FILM COATED ORAL
Qty: 60 | Refills: 5 | Status: DISCONTINUED | COMMUNITY
Start: 2019-04-01 | End: 2021-01-11

## 2021-01-11 NOTE — PHYSICAL EXAM
[Person] : oriented to person [Place] : oriented to place [Time] : oriented to time [Short Term Intact] : short term memory intact [Remote Intact] : remote memory intact [Registration Intact] : recent registration memory intact [Concentration Intact] : normal concentrating ability [Visual Intact] : visual attention was ~T not ~L decreased [Naming Objects] : no difficulty naming common objects [Repeating Phrases] : no difficulty repeating a phrase [Writing A Sentence] : no difficulty writing a sentence [Fluency] : fluency intact [Comprehension] : comprehension intact [Reading] : reading intact [Past History] : adequate knowledge of personal past history [Cranial Nerves Optic (II)] : visual acuity intact bilaterally,  visual fields full to confrontation, pupils equal round and reactive to light [Cranial Nerves Oculomotor (III)] : extraocular motion intact [Cranial Nerves Trigeminal (V)] : facial sensation intact symmetrically [Cranial Nerves Facial (VII)] : face symmetrical [Cranial Nerves Vestibulocochlear (VIII)] : hearing was intact bilaterally [Cranial Nerves Glossopharyngeal (IX)] : tongue and palate midline [Cranial Nerves Accessory (XI - Cranial And Spinal)] : head turning and shoulder shrug symmetric [Motor Tone] : muscle tone was normal in all four extremities [Motor Handedness Right-Handed] : the patient is right hand dominant [4] : shoulder abduction 4/5 [Hand Weakness Right] : the hand  was weak [+4] : arm extension  +4/5 [5] : ankle plantar flexion 5/5 [Sensation Tactile Decrease] : light touch was intact [Sensation Vibration Decrease] : vibration was intact [Proprioception] : proprioception was intact [4+] : Ankle jerk left 4+ [Plantar Reflex Right Only] : abnormal on the right [Plantar Reflex Left Only] : abnormal on the left [___] : [unfilled] ~Ubeats present on the right [Hand Weakness Left] : normal hand  [Past-pointing] : there was no past-pointing [Tremor] : no tremor present [___] : absent on the left [FreeTextEntry5] : tongue fascics noted, no useful movement of tongue [FreeTextEntry6] : mild left scapular winging with mild periscapular atrophy, fascics noted left infraspinatus. PFT's: FEV1 3.6 99% predicted\par ALS-FRS 30/48 [FreeTextEntry8] : spastic gait with rollator [FreeTextEntry9] : Right deltoid jerk, and right Negron sign

## 2021-01-11 NOTE — ASSESSMENT
[FreeTextEntry1] : ANATOLIY MORA evaluated by me today  in multidisciplinary ALS clinic; requires evaluations today by PT/OT/speech and swallow therapists.  Social work needs addressed and goals of care reinforced. End of life care including health care proxy and patient wishes discussed.\par \par I told patient it is not safe for him to drive and he endorsed to me that he won't drive.  \par \par Nutrition/dietary needs discussed and addressed, using PEG feeds and will continue, endorsed he will stop drinking beer as he does not want to be intubated.   \par \par Pulmonary function assessed by respiratory therapy and spirometry.  Patient’s respiratory function is Os sat 96, ET CO2 35, HR 90, RR 20, cough flow 300 NIF -60; no recs\par \par Saliva management needs assessed - due to pseudobulbar affect and h/o asp PNA will order portable suction with jaxon.\par \par PT/OT recommends:  rec home PT, will apply for motorized WC.  \par \par Speech/Swallow therapy recommends:  reinforced no PO intake and only PEG feeds.  He is now interested in AAC.  \par \par f/u in 3 months with me.  \par \par

## 2021-01-11 NOTE — HISTORY OF PRESENT ILLNESS
[FreeTextEntry1] : Patient here for ALS clinic f/u.\par \par No speech, minimal saliva/drooling.  Taking all calories via PEG but drinks ice tea and beer.  \par \par Can write still but slower, ADL's are slow and he is getting assistance. He has stopped taking the riluzole and didn't come to last ALS clinic.  Using walker to ambulate.  Has had a couple falls but less after the walkers.  \par \par Denies SOB or MEJÍA.  Having trouble brushing teeth as the toothbrush gags him.

## 2021-01-12 ENCOUNTER — NON-APPOINTMENT (OUTPATIENT)
Age: 56
End: 2021-01-12

## 2021-01-27 ENCOUNTER — APPOINTMENT (OUTPATIENT)
Dept: SPEECH THERAPY | Facility: CLINIC | Age: 56
End: 2021-01-27
Payer: COMMERCIAL

## 2021-01-27 PROCEDURE — 92607 EX FOR SPEECH DEVICE RX 1HR: CPT | Mod: GN

## 2021-01-27 PROCEDURE — 92618 EX FOR NONSPEECH DEV RX ADD: CPT | Mod: GN

## 2021-01-27 PROCEDURE — 92605 EX FOR NONSPEECH DEVICE RX: CPT | Mod: GN

## 2021-02-04 ENCOUNTER — NON-APPOINTMENT (OUTPATIENT)
Age: 56
End: 2021-02-04

## 2021-02-08 ENCOUNTER — NON-APPOINTMENT (OUTPATIENT)
Age: 56
End: 2021-02-08

## 2021-02-26 ENCOUNTER — RX RENEWAL (OUTPATIENT)
Age: 56
End: 2021-02-26

## 2021-02-26 RX ORDER — SERTRALINE HYDROCHLORIDE 20 MG/ML
20 SOLUTION ORAL DAILY
Qty: 60 | Refills: 5 | Status: ACTIVE | COMMUNITY
Start: 2020-08-14 | End: 1900-01-01

## 2021-03-26 NOTE — ASSESSMENT
[FreeTextEntry1] : Speech/ Language/ Voice Evaluation and AAC Evaluation is Completed. Evaluation was performed with remote assistance from Haven Cottrell (SLP from [facility]). Comprehensive note is in progress in and will be entered in it's entirety upon completion. For any questions, please contact [phone]. Thank you. \par \par The patient was seen for a total of 90 minutes (1:30 PM - 3 PM).

## 2021-04-13 ENCOUNTER — APPOINTMENT (OUTPATIENT)
Dept: NEUROLOGY | Facility: CLINIC | Age: 56
End: 2021-04-13

## 2021-04-22 ENCOUNTER — APPOINTMENT (OUTPATIENT)
Dept: INTERNAL MEDICINE | Facility: CLINIC | Age: 56
End: 2021-04-22
Payer: COMMERCIAL

## 2021-04-22 VITALS
HEIGHT: 68 IN | DIASTOLIC BLOOD PRESSURE: 74 MMHG | OXYGEN SATURATION: 94 % | SYSTOLIC BLOOD PRESSURE: 118 MMHG | RESPIRATION RATE: 16 BRPM | BODY MASS INDEX: 25.01 KG/M2 | WEIGHT: 165 LBS | HEART RATE: 87 BPM | TEMPERATURE: 98.9 F

## 2021-04-22 DIAGNOSIS — G12.21 AMYOTROPHIC LATERAL SCLEROSIS: ICD-10-CM

## 2021-04-22 DIAGNOSIS — J47.9 BRONCHIECTASIS, UNCOMPLICATED: ICD-10-CM

## 2021-04-22 DIAGNOSIS — R06.00 DYSPNEA, UNSPECIFIED: ICD-10-CM

## 2021-04-22 DIAGNOSIS — R05 COUGH: ICD-10-CM

## 2021-04-22 DIAGNOSIS — G70.9 MYONEURAL DISORDER, UNSPECIFIED: ICD-10-CM

## 2021-04-22 PROCEDURE — 99214 OFFICE O/P EST MOD 30 MIN: CPT

## 2021-04-22 PROCEDURE — 99072 ADDL SUPL MATRL&STAF TM PHE: CPT

## 2021-04-22 RX ORDER — SERTRALINE 25 MG/1
25 TABLET, FILM COATED ORAL DAILY
Qty: 30 | Refills: 5 | Status: DISCONTINUED | COMMUNITY
Start: 2020-08-13 | End: 2021-04-22

## 2021-04-22 RX ORDER — MIRTAZAPINE 15 MG/1
15 TABLET, ORALLY DISINTEGRATING ORAL AT BEDTIME
Qty: 30 | Refills: 5 | Status: DISCONTINUED | COMMUNITY
Start: 2020-06-19 | End: 2021-04-22

## 2021-04-22 RX ORDER — NICOTINE POLACRILEX 2 MG
5000 LOZENGE BUCCAL DAILY
Refills: 0 | Status: DISCONTINUED | COMMUNITY
Start: 2018-11-29 | End: 2021-04-22

## 2021-04-22 RX ORDER — MULTIVITAMIN
TABLET ORAL DAILY
Refills: 0 | Status: DISCONTINUED | COMMUNITY
Start: 2020-02-28 | End: 2021-04-22

## 2021-04-22 RX ORDER — CEPHALEXIN 500 MG/1
500 CAPSULE ORAL
Qty: 20 | Refills: 0 | Status: DISCONTINUED | COMMUNITY
Start: 2020-05-01 | End: 2021-04-22

## 2021-04-22 RX ORDER — CHROMIUM 200 MCG
10 MCG TABLET ORAL
Refills: 0 | Status: DISCONTINUED | COMMUNITY
Start: 2020-02-28 | End: 2021-04-22

## 2021-04-22 RX ORDER — OMEGA-3/DHA/EPA/FISH OIL 300-1000MG
1000 CAPSULE ORAL DAILY
Refills: 0 | Status: DISCONTINUED | COMMUNITY
Start: 2020-02-28 | End: 2021-04-22

## 2021-04-22 RX ORDER — MIRTAZAPINE 15 MG/1
15 TABLET, FILM COATED ORAL
Qty: 30 | Refills: 0 | Status: DISCONTINUED | COMMUNITY
Start: 2020-06-19 | End: 2021-04-22

## 2021-04-22 RX ORDER — RILUZOLE 50 MG/10ML
50 LIQUID ORAL
Qty: 1 | Refills: 5 | Status: DISCONTINUED | COMMUNITY
Start: 2021-01-11 | End: 2021-04-22

## 2021-04-22 RX ORDER — GLUCOSA SU 2KCL/CHONDROITIN SU 500-400 MG
100 CAPSULE ORAL
Refills: 0 | Status: DISCONTINUED | COMMUNITY
Start: 2020-02-28 | End: 2021-04-22

## 2021-04-22 RX ORDER — OXYBUTYNIN CHLORIDE 5 MG/1
5 TABLET, EXTENDED RELEASE ORAL
Qty: 30 | Refills: 0 | Status: DISCONTINUED | COMMUNITY
Start: 2021-01-05 | End: 2021-04-22

## 2021-04-22 NOTE — PHYSICAL EXAM
[No Acute Distress] : no acute distress [No JVD] : no jugular venous distention [Supple] : supple [Clear to Auscultation] : lungs were clear to auscultation bilaterally [Normal Rate] : normal rate  [Regular Rhythm] : with a regular rhythm [Normal S1, S2] : normal S1 and S2 [No Extremity Clubbing/Cyanosis] : no extremity clubbing/cyanosis [No Edema] : there was no peripheral edema [Normal Posterior Cervical Nodes] : no posterior cervical lymphadenopathy [Normal Anterior Cervical Nodes] : no anterior cervical lymphadenopathy [No Rash] : no rash

## 2021-04-22 NOTE — PLAN
[FreeTextEntry1] : 1. At this time, I have recommended that he reinstitute Thick-It, with all thin liquids that he is consuming. He states that he will attempt to do this as recommended.\par \par 2. Since it appears as if his cough, maybe secondary to reflux and/or/aspiration after a 900 cc volume infusion of his tube feedings, I have recommended that he split the tube feedings into 3 equal aliquots, which would be approximately 600 cc 3 times a day.\par \par 3. At this time, I do not feel that further antibiotics are indicated given the fact that he is clinically asymptomatic, and his lungs are clear.\par \par 4. Aspiration precautions elevation of the head of the bed have also been recommended.\par \par 5. The patient will followup on a p.r.n. basis.

## 2021-04-22 NOTE — HISTORY OF PRESENT ILLNESS
[FreeTextEntry1] : The patient comes in today for a followup pulmonary evaluation.\par  [de-identified] : The patient presented today for an evaluation. The patient apparently, noted the onset of a cough approximately 5 weeks ago he denied having any fevers or chills. There was no significant purulent sputum production. He spoke to his primary care physician, Dr. Serrano, who placed him on doxycycline for 12 days. He finished the antibiotic. He now comes in for this assessment.\par \par With regards to his underlying ALS, he continues to deteriorate. At this time, he has decided against tracheostomy or mechanical ventilation. He actually met with a palliative care 2 earlier today, and he is considering his long-term options.\par \par With regards to his feedings, he is currently receiving 1500 cc via his PEG tube b.i.d. basis. After further questioning, it appears as if his cough appears to be somewhat more pronounced after the tube feedings are completed. Patient does occasionally drink on thickened liquids after completing the PEG tube feedings. He still likes to have the "taste" uncertain drinks and foods. At times, he does cough and choke when swallowing. He now comes in for this assessment.

## 2021-05-24 ENCOUNTER — NON-APPOINTMENT (OUTPATIENT)
Age: 56
End: 2021-05-24

## 2021-08-20 ENCOUNTER — EMERGENCY (EMERGENCY)
Facility: HOSPITAL | Age: 56
LOS: 1 days | Discharge: HOME | End: 2021-08-20
Attending: EMERGENCY MEDICINE
Payer: COMMERCIAL

## 2021-08-20 VITALS
OXYGEN SATURATION: 98 % | HEART RATE: 86 BPM | WEIGHT: 160.06 LBS | HEIGHT: 68.5 IN | RESPIRATION RATE: 14 BRPM | DIASTOLIC BLOOD PRESSURE: 87 MMHG | SYSTOLIC BLOOD PRESSURE: 139 MMHG | TEMPERATURE: 98 F

## 2021-08-20 VITALS
OXYGEN SATURATION: 95 % | HEART RATE: 88 BPM | SYSTOLIC BLOOD PRESSURE: 154 MMHG | TEMPERATURE: 99 F | RESPIRATION RATE: 16 BRPM | DIASTOLIC BLOOD PRESSURE: 95 MMHG

## 2021-08-20 DIAGNOSIS — Z93.1 GASTROSTOMY STATUS: Chronic | ICD-10-CM

## 2021-08-20 DIAGNOSIS — Z98.890 OTHER SPECIFIED POSTPROCEDURAL STATES: Chronic | ICD-10-CM

## 2021-08-20 LAB — SARS-COV-2 RNA SPEC QL NAA+PROBE: SIGNIFICANT CHANGE UP

## 2021-08-20 PROCEDURE — 77002 NEEDLE LOCALIZATION BY XRAY: CPT

## 2021-08-20 PROCEDURE — U0003: CPT

## 2021-08-20 PROCEDURE — 43762 RPLC GTUBE NO REVJ TRC: CPT

## 2021-08-20 PROCEDURE — 99284 EMERGENCY DEPT VISIT MOD MDM: CPT | Mod: 25

## 2021-08-20 PROCEDURE — 49450 REPLACE G/C TUBE PERC: CPT

## 2021-08-20 PROCEDURE — U0005: CPT

## 2021-08-20 PROCEDURE — 99283 EMERGENCY DEPT VISIT LOW MDM: CPT

## 2021-08-20 PROCEDURE — C1769: CPT

## 2021-08-20 PROCEDURE — L8699: CPT

## 2021-08-20 RX ORDER — SERTRALINE 25 MG/1
2 TABLET, FILM COATED ORAL
Qty: 0 | Refills: 0 | DISCHARGE

## 2021-08-20 NOTE — ED ADULT NURSE REASSESSMENT NOTE - NS ED NURSE REASSESS COMMENT FT1
assumed care of pt @ this time. received pt a&ox3, no acute distress, breaths even and unlabored with no complaints at this time. denies any pain. VSS. pt aware of waiting for covid results then IR for peg tube replacement. will continue to reassess.

## 2021-08-20 NOTE — ED PROVIDER NOTE - PATIENT PORTAL LINK FT
You can access the FollowMyHealth Patient Portal offered by [facility] by registering at the following website: [email or web address]. By joining [employer]’s FollowMyHealth portal, you will also be able to view your health information using other applications (apps) compatible with our system.

## 2021-08-20 NOTE — ED ADULT NURSE REASSESSMENT NOTE - NS ED NURSE REASSESS COMMENT FT1
pt cleaned, changed and repositioned in stretcher by SNA. pt continuously refusing IV and anesthesia for PEG tube replacement. manager of IR made aware by ANM of ED. no IV required at this time. will continue to reassess.

## 2021-08-20 NOTE — ED PROVIDER NOTE - NSFOLLOWUPINSTRUCTIONS_ED_ALL_ED_FT
How to Use and Care for Your PEG Tube    WHAT YOU NEED TO KNOW:    Healthcare providers will teach you how to put liquid food and certain medicines through the tube. You will also be taught how to care for the PEG tube and the skin where the tube enters your body.    DISCHARGE INSTRUCTIONS:    Call your doctor or gastroenterologist if:   •You start coughing or vomiting during or after a feeding.      •You have severe abdominal pain.      •Blood or tube feeding fluid leaks from the PEG tube site.      •Your PEG tube is shorter than it was when it was put in.      •Your PEG tube comes out.      •Your mouth feels dry, your heart feels like it is beating too fast, or you feel weak.      •You have nausea, diarrhea, or abdominal bloating or discomfort.      •You have stomach pain after each feeding or when you move around.      •You have discomfort or pain around your PEG tube site.      •The skin around your PEG tube is red, swollen, or draining pus.      •You weigh less than your healthcare provider says you should.      •Your PEG tube is longer than it was when it was put in.      •You have questions or concerns about your condition or care.      How to use a PEG tube for feedings: Your healthcare provider will tell you when and how often to use your PEG tube for feedings. A bolus feeding means nutrition is given over a short period of time. An intermittent feeding is scheduled for certain times throughout the day. Continuous feedings run all the time. The following are types of PEG tube systems:   •A feeding syringe helps liquid food to flow steadily into the PEG tube. The syringe is connected to the end of the PEG tube. You will pour the liquid into the syringe and hold it up high. The syringe plunger may be used to gently push the last of the liquid through the PEG tube.      •A gravity drip bag allows liquid food to drip more slowly into the PEG tube. The tubing from the gravity drip bag is connected to the end of the PEG tube. You will pour the liquid into the bag. The bag hangs on a medical pole or similar device. You can adjust the flow rate on the tubing according to your healthcare provider's instructions.      •An electric feeding pump controls the flow of the liquid food into your PEG tube. Your healthcare provider will teach you how to set up and use the pump.      How to care for your PEG tube:   •Always wash your hands before and after each use. This helps prevent infections. Use soap and water to wash your hands.  Handwashing           •Always flush your PEG tube before and after each use. This helps prevent blockage from formula or medicine. Use at least 30 milliliters (mL) of water to flush the tube. Follow directions for flushing your PEG tube.      •If your PEG tube becomes clogged, try to unclog it as soon as you can. Flush your PEG tube with a 60 mL syringe filled with warm water. Never use a wire to unclog the tube. A wire can poke a hole in the tube. Your healthcare provider may have you use a medicine or a plastic brush to help unclog your tube.      •Check the PEG tube daily: ?Check the length of the tube from the end to where it goes into your body. If it gets longer, it may be at risk for coming out. If it gets shorter, let your healthcare provider know right away.      ?Check the bumper. The bumper is a piece that goes around the tube, next to your skin. It should be snug against your skin. Tell your healthcare provider if the bumper seems too tight or too loose.      •Use an alcohol pad to clean the end of your PEG tube. Clean before you connect tubing or a syringe to your PEG tube and after you remove it. Do not let the end of the PEG tube touch anything.      How do I care for the skin around my PEG tube?   •Do not remove the stitches or medical tape. Stitches or medical tape hold your PEG tube in place when you first get it. Your healthcare provider will take them off once the skin around your tube heals. Leave clean bandages over the tube area for the first 24 hours after the tube is put in. You may not need to use bandages after 24 hours if the skin around the tube looks dry. Ask when you can shower or bathe.      •Routine skin care: ?Clean the skin around your tube 1 to 2 times each day. Ask your healthcare provider what you should use to clean your skin. Check for redness, swelling, or pus in the area where the tube goes into your body. Check for fluid draining from your stoma (the hole where the tube was put in).      ?Gently turn your tube daily after your stitches come out. This may decrease pressure on your skin under the bumper. It may also help prevent an infection.      ?Keep the skin around your PEG tube dry. This will help prevent skin irritation and infection.      •Use topical medicines as directed. You may need to put antibiotic cream on the skin around your tube after you are done cleaning it.      Other tips to know about a PEG tube:   •Keep a record of liquids you have each day. You may also need to keep a record of how much you urinate and how many times you have a bowel movement each day. Bring this record to your follow-up visits.      •Check your weight as directed. Keep a record of your weights and bring it to your follow-up visits. Your healthcare provider may need to change your feedings if your weight changes too quickly.  Weight Checks JAMAL           •Take your medicines as directed. Learn which of your medicines can be crushed, mixed with water, and given through the PEG tube. Certain medicines should not be crushed or may clog the PEG tube.      •Go to all follow-up appointments. You may need to have blood tests and other tests when you see your healthcare provider.      Follow up with your doctor or gastroenterologist as directed: Write down your questions so you remember to ask them during your visits.

## 2021-08-20 NOTE — ED ADULT NURSE NOTE - OBJECTIVE STATEMENT
Pt is A&Ox4, nonverbal at baseline, communicates using text on phone. Patient presents to ED from nursing home c/o dislodged G-tube after accidentally ripping it out last night. Patient denies pain at this time. Surgical site is dry and intact with no signs of infection. As per Dr. Slater patient to see IR as G-tube was unable to be replaced here in ED. Patient is aware of POC. Will continue to monitor.

## 2021-08-20 NOTE — ED PROVIDER NOTE - OBJECTIVE STATEMENT
56yom w/ ALS on hospice care accidently pulled out PEG tube. Has no pain or discomfort, referred to ED for replacement.,

## 2021-08-20 NOTE — ED ADULT TRIAGE NOTE - CHIEF COMPLAINT QUOTE
PT coming from hospice center for dislodged feeding tube.  Surgical site is clean and without signs of infection.  Tube was pulled out by patient by accident.  PT denies pain.  Hx of ALS and is nonverbal, communicates via text.

## 2021-08-20 NOTE — ED PROVIDER NOTE - CARE PLAN
1 Principal Discharge DX:	Adjustment, replacement, or removal of percutaneous endoscopic gastrostomy (PEG) tube

## 2021-08-20 NOTE — CHART NOTE - NSCHARTNOTEFT_GEN_A_CORE
Vascular & Interventional Radiology Pre-Procedure Note    Procedure Name: ___gastrostomy tube reinsertion____    HPI: 56y Male with ___dislodged gastrostomy tube____    56yom w/ ALS on hospice care accidently pulled out PEG tube. Has no pain or discomfort.    PAST MEDICAL HISTORY:  H/O bronchiectasis     H/O solitary pulmonary nodule RLL    History of dysphagia Hx of aspiration PNA 3/2019    IBALS (isolated bulbar amyotrophic lateral sclerosis) Pt is nonverbal, communicates with phone luis carlos/writing.  Has 5/5 strength in all extremeties.     PAST SURGICAL HISTORY:  Gastrostomy tube in place may 2020    H/O colonoscopy.      Allergies: No Known Allergies      Medications (Abx/Cardiac/Anticoagulation/Blood Products)  · 	Vitamin B12: 5000 microgram(s) sublingual once a day  · 	Zoloft 20 mg/mL oral concentrate: 2 milliliter(s) orally once a day      Data:  174  72.6    T(C): 37.2  HR: 91  BP: 137/93  RR: 16  SpO2: 95%    Exam  General: ____ataxic___  Chest: ___wnl____  Abdomen: ___wnl____  Extremities: ___wnl____    Imaging: ___na____    Plan:   -56y Male presents for ___G tube reinsertion____  -Risks/Benefits/alternatives explained with the patient and witnessed informed consent obtained.

## 2021-08-20 NOTE — ED PROVIDER NOTE - NSICDXPASTMEDICALHX_GEN_ALL_CORE_FT
PAST MEDICAL HISTORY:  H/O bronchiectasis     H/O solitary pulmonary nodule RLL    History of dysphagia Hx of aspiration PNA 3/2019    IBALS (isolated bulbar amyotrophic lateral sclerosis) Pt is nonverbal, communicates with phone luis carlos/writing.  Has 5/5 strength in all extremeties

## 2022-04-29 NOTE — ED PROVIDER NOTE - TOBACCO USE
[Normal] : soft, non-tender, non-distended, no masses palpated, no HSM and normal bowel sounds Unknown if ever smoked

## 2022-08-04 ENCOUNTER — EMERGENCY (EMERGENCY)
Facility: HOSPITAL | Age: 57
LOS: 1 days | Discharge: HOME | End: 2022-08-04
Attending: EMERGENCY MEDICINE
Payer: COMMERCIAL

## 2022-08-04 VITALS
TEMPERATURE: 99 F | RESPIRATION RATE: 18 BRPM | HEART RATE: 98 BPM | SYSTOLIC BLOOD PRESSURE: 131 MMHG | DIASTOLIC BLOOD PRESSURE: 88 MMHG | OXYGEN SATURATION: 96 %

## 2022-08-04 VITALS
DIASTOLIC BLOOD PRESSURE: 81 MMHG | OXYGEN SATURATION: 98 % | WEIGHT: 164.91 LBS | SYSTOLIC BLOOD PRESSURE: 129 MMHG | HEIGHT: 68.5 IN | HEART RATE: 91 BPM | RESPIRATION RATE: 20 BRPM | TEMPERATURE: 98 F

## 2022-08-04 DIAGNOSIS — Z98.890 OTHER SPECIFIED POSTPROCEDURAL STATES: Chronic | ICD-10-CM

## 2022-08-04 DIAGNOSIS — Z93.1 GASTROSTOMY STATUS: Chronic | ICD-10-CM

## 2022-08-04 PROCEDURE — 43762 RPLC GTUBE NO REVJ TRC: CPT

## 2022-08-04 PROCEDURE — 99284 EMERGENCY DEPT VISIT MOD MDM: CPT | Mod: 25

## 2022-08-04 PROCEDURE — L8699: CPT

## 2022-08-04 PROCEDURE — 74018 RADEX ABDOMEN 1 VIEW: CPT

## 2022-08-04 PROCEDURE — 74018 RADEX ABDOMEN 1 VIEW: CPT | Mod: 26

## 2022-08-04 RX ORDER — DIATRIZOATE MEGLUMINE 180 MG/ML
30 INJECTION, SOLUTION INTRAVESICAL ONCE
Refills: 0 | Status: DISCONTINUED | OUTPATIENT
Start: 2022-08-04 | End: 2022-08-04

## 2022-08-04 RX ORDER — DIATRIZOATE MEGLUMINE 180 MG/ML
30 INJECTION, SOLUTION INTRAVESICAL ONCE
Refills: 0 | Status: COMPLETED | OUTPATIENT
Start: 2022-08-04 | End: 2022-08-04

## 2022-08-04 RX ADMIN — DIATRIZOATE MEGLUMINE 30 MILLILITER(S): 180 INJECTION, SOLUTION INTRAVESICAL at 13:39

## 2022-08-04 NOTE — ED ADULT NURSE NOTE - OBJECTIVE STATEMENT
58 y/o M PMHx ALS c/c sent in by hospice care due to fixing PEG tube replacement. Safety precautions maintained.

## 2022-08-04 NOTE — ED PROVIDER NOTE - PROGRESS NOTE DETAILS
Nagi: xray abd reviewed, G tube in good position, contrast without extrav, fills stomach and transits to small bowel

## 2022-08-04 NOTE — ED PROVIDER NOTE - CLINICAL SUMMARY MEDICAL DECISION MAKING FREE TEXT BOX
g tube replaced at bedside without difficulty, flushes easily, +return of gastric contents.   Awaiting confirmatory xray; SW to assist with transport home

## 2022-08-04 NOTE — ED ADULT NURSE NOTE - CAS ELECT INFOMATION PROVIDED
Discharge instructions reviewed with pt and copy given by [facility]
Received patient to exam room, sitting in a chair in a position of comfort, call bell within reach; pt reports right sided intermittent HA x 4 days, he reports hx migraines and states this feels similar, pt also reports chronic neck pain secondary to a known \"cervical bulge\", pt is followed by neurology but has not called them, he has tried his PRN meds without relief
DC instructions

## 2022-08-04 NOTE — ED ADULT TRIAGE NOTE - CHIEF COMPLAINT QUOTE
BIBA from home - send by Hospice care for " scheduled " G tube replacement. Pt is non verbal at baseline

## 2022-08-04 NOTE — ED PROCEDURE NOTE - CPROC ED INFORMED CONSENT1
Benefits, risks, and possible complications of procedure explained to patient/caregiver who verbalized understanding and gave verbal consent. The patient is a 48y Male complaining of

## 2022-08-04 NOTE — ED PROVIDER NOTE - NSFOLLOWUPINSTRUCTIONS_ED_ALL_ED_FT
Gastrostomy Tube Replacement, Care After      This sheet gives you information about how to care for yourself after your procedure. Your health care provider may also give you more specific instructions. If you have problems or questions, contact your health care provider.      What can I expect after the procedure?    After the procedure, it is common to have:  •Mild pain in your abdomen.      •A small amount of blood-colored fluid leaking from the site of your gastrostomy tube (G-tube) replacement.        Follow these instructions at home:     •Return to your normal activities as told by your health care provider.      •You may return to your normal feedings.      •Wash your hands for at least 20 seconds before and after caring for your G-tube.    •Check the skin around your tube insertion site for:  •Redness.      •Irritation.      •Swelling.      •Drainage.      •Extra tissue growth.        •Care for your G-tube as you did before, or as told by your health care provider.      •Keep all follow-up visits. This is important.        Contact a health care provider if:    •You have a fever or chills.    •You see any of these signs on the skin near your insertion site:  •Redness.      •Irritation.      •Swelling.      •Drainage.      •Extra tissue growth.        •You continue to have pain in the abdomen or leaking around your G-tube.        Get help right away if:    •You develop bleeding or a lot of discharge around the tube.      •You have severe pain in the abdomen.      •Your new tube is not working properly.      •You are unable to get feedings into the tube.      •Your tube comes out for any reason.        Summary    •After the procedure, it is common to have mild pain in your abdomen and a small amount of blood-colored fluid.      •Return to your normal activities and feedings as told by your health care provider.      •Care for your gastrostomy tube, or G-tube, as you did before, or as told by your health care provider.      This information is not intended to replace advice given to you by your health care provider. Make sure you discuss any questions you have with your health care provider.

## 2022-08-04 NOTE — ED PROVIDER NOTE - OBJECTIVE STATEMENT
58 yo male hx of ALS, home hospice, with perc gastric tube in place for all PO intake; BIB spouse for degradation of cap on existing G tube, concerned that it is broken and will start to leak; tube is otherwise working well; no other complaints, no fever, no abd pain; last G tube changed 1 year ago here; records indicate an attempt by ED, but required IR.     I discussed case with patient spouse and IR; given records from last visit, type of tube, and technique utilized, reasonable to attempt replacement at bedside in ED; if unable to will consult IR

## 2022-08-04 NOTE — ED PROVIDER NOTE - GASTROINTESTINAL, MLM
Abdomen soft, non-tender, no guarding. G tube LUQ in place; no leakage; +degradation of occlusive cap at input site

## 2022-08-04 NOTE — ED PROVIDER NOTE - PATIENT PORTAL LINK FT
You can access the FollowMyHealth Patient Portal offered by [facility] by registering at the following website: [email or web address]. By joining [email or web address]’s FollowMyHealth portal, you will also be able to view your health information using other applications (apps) compatible with our system.

## 2022-08-04 NOTE — ED PROCEDURE NOTE - PROCEDURE ADDITIONAL DETAILS
Patient requiring G tube replacement; exisiting G tube - cuff deflated, tube removed with easy manual traction, no complications; tube replaced using 18F avanos gastrostomy tube, cuff inflated with 8cc of sterile water; tube flushed with ease, +return of gastric contents; Xray confirms placement.

## 2023-02-15 NOTE — HISTORY OF PRESENT ILLNESS
Assumed patient care and report received from Rupa JAMES.    Pending CT scan study to be done    [de-identified] : This patient comes in today for a follow up evaluation. There are several issues to discuss.\par \par Overall, the patient has not been doing well. He has a history of ALS. He is no longer able to speak, however, he notes that his strength has not decreased. He uses a vqui-an-xscuh luis carlos to communicate. He is no longer able to form a tight seal with his lips. He takes longer to swallow and digest his meals, however, he denies choking and difficulty with saliva. He occasionally bites his tongue or cheek while chewing. As a result, he has changed the consistency of his foods. He is considering a percutaneous gastrostomy tube placed in the near future. His weight has remained stable. He is followed by Dr. Rosario. \par \par He continues to use his respiratory muscle  one time per day. He states that he would like to use it more often. He continues to exercise on his bike and play tennis. He denies CP, tightness, palpitations, dyspnea on exertion, coughing, wheezing, SOB, purulent nasal secretions, and sputum production.\par \par He denies fevers, chills, night sweats and any other constitutional symptoms. He now comes in for this assessment.

## 2023-03-10 ENCOUNTER — EMERGENCY (EMERGENCY)
Facility: HOSPITAL | Age: 58
LOS: 1 days | Discharge: HOME | End: 2023-03-10
Attending: EMERGENCY MEDICINE
Payer: COMMERCIAL

## 2023-03-10 VITALS
HEART RATE: 107 BPM | TEMPERATURE: 99 F | OXYGEN SATURATION: 94 % | SYSTOLIC BLOOD PRESSURE: 112 MMHG | RESPIRATION RATE: 20 BRPM | DIASTOLIC BLOOD PRESSURE: 67 MMHG

## 2023-03-10 DIAGNOSIS — Z98.890 OTHER SPECIFIED POSTPROCEDURAL STATES: Chronic | ICD-10-CM

## 2023-03-10 DIAGNOSIS — Z93.1 GASTROSTOMY STATUS: Chronic | ICD-10-CM

## 2023-03-10 PROCEDURE — 43762 RPLC GTUBE NO REVJ TRC: CPT

## 2023-03-10 PROCEDURE — 74018 RADEX ABDOMEN 1 VIEW: CPT

## 2023-03-10 PROCEDURE — 74018 RADEX ABDOMEN 1 VIEW: CPT | Mod: 26

## 2023-03-10 PROCEDURE — 99284 EMERGENCY DEPT VISIT MOD MDM: CPT | Mod: 25

## 2023-03-10 PROCEDURE — 99284 EMERGENCY DEPT VISIT MOD MDM: CPT

## 2023-03-10 PROCEDURE — L8699: CPT

## 2023-03-10 RX ORDER — DIATRIZOATE MEGLUMINE 180 MG/ML
30 INJECTION, SOLUTION INTRAVESICAL ONCE
Refills: 0 | Status: COMPLETED | OUTPATIENT
Start: 2023-03-10 | End: 2023-03-10

## 2023-03-10 RX ADMIN — DIATRIZOATE MEGLUMINE 30 MILLILITER(S): 180 INJECTION, SOLUTION INTRAVESICAL at 14:41

## 2023-03-10 NOTE — ED PROVIDER NOTE - PHYSICAL EXAMINATION
General: NAD  Head: NC, AT  EENT: EOMI, no scleral icterus  Cardiac: no lower extremity edema  Respiratory: no respiratory distress   Abdomen: soft, ND, NT, nonperitonitic, PEG site is clean, dry, and intact  MSK/Vascular: full ROM, soft   Neuro: communicates with phone luis carlos, conversation appropriate

## 2023-03-10 NOTE — ED PROVIDER NOTE - NSFOLLOWUPINSTRUCTIONS_ED_ALL_ED_FT
Gastrostomy Tube Replacement, Care After    This sheet gives you information about how to care for yourself after your procedure. Your health care provider may also give you more specific instructions. If you have problems or questions, contact your health care provider.    What can I expect after the procedure?    After the procedure, it is common to have:  •Mild pain in your abdomen.  •A small amount of blood-colored fluid leaking from the site of your gastrostomy tube (G-tube) replacement.      Follow these instructions at home:     •Return to your normal activities as told by your health care provider.  •You may return to your normal feedings.  •Wash your hands for at least 20 seconds before and after caring for your G-tube.  •Check the skin around your tube insertion site for:  •Redness.  •Irritation.  •Swelling.  •Drainage.  •Extra tissue growth.  •Care for your G-tube as you did before, or as told by your health care provider.  •Keep all follow-up visits. This is important.    Contact a health care provider if:    •You have a fever or chills.  •You see any of these signs on the skin near your insertion site:  •Redness.  •Irritation.  •Swelling.  •Drainage.  •Extra tissue growth.  •You continue to have pain in the abdomen or leaking around your G-tube.    Get help right away if:    •You develop bleeding or a lot of discharge around the tube.  •You have severe pain in the abdomen.  •Your new tube is not working properly.  •You are unable to get feedings into the tube.  •Your tube comes out for any reason.    Summary    •After the procedure, it is common to have mild pain in your abdomen and a small amount of blood-colored fluid.  •Return to your normal activities and feedings as told by your health care provider.  •Care for your gastrostomy tube, or G-tube, as you did before, or as told by your health care provider.

## 2023-03-10 NOTE — ED PROVIDER NOTE - PATIENT PORTAL LINK FT
You can access the FollowMyHealth Patient Portal offered by [facility] by registering at the following website: [email or web address]. By joining [employer]’s FollowMyHealth portal, you will also be able to view your health information using other applications (apps) compatible with our system.

## 2023-03-10 NOTE — ED PROVIDER NOTE - OBJECTIVE STATEMENT
56 y/o male w/PMHx of ALS on home hospice with perc g-tube in place brought in because the g tube fell out earlier this morning. Last fell out in early August of 2022 and replaced in the ED w/out complications.

## 2023-03-10 NOTE — ED PROVIDER NOTE - CLINICAL SUMMARY MEDICAL DECISION MAKING FREE TEXT BOX
New G tube placed w/out resistance. + Return of gastric contents. Gastrograffin study shows appropriate location of contrast in the stomach. Will d/c.

## 2023-03-10 NOTE — ED PROVIDER NOTE - ATTENDING CONTRIBUTION TO CARE
58 y/o male w/PMHx of ALS on home hospice with perc g-tube in place brought in because the g tube fell out earlier this morning. Last fell out in early August of 2022 and replaced in the ED w/out complications.    No trauma noted.  18 [place] G-tube  replaced in ED.  X-ray notes contrast is within the GI system.  to secure it and patient stable for discharge with outpatient follow-up and return precautions.  Case discussed with patient's wife Javier

## 2023-03-10 NOTE — ED ADULT TRIAGE NOTE - CHIEF COMPLAINT QUOTE
as per EMS pts PEG tube came out while having a feeding, pt has ALS communicates with an luis carlos, pt was also co constipation and some abdominal discomfort for a couple of days.

## 2023-03-11 NOTE — ED POST DISCHARGE NOTE - DETAILS
Spoke with patient's wife kaylynn  and discussed results of x-ray.  Advised her to send patient back to emergency department for exchange of tube and repeat x-ray to confirm correct placement..   She verbalized understanding and will make arrangements.

## 2023-03-13 ENCOUNTER — EMERGENCY (EMERGENCY)
Facility: HOSPITAL | Age: 58
LOS: 1 days | Discharge: HOME | End: 2023-03-13
Attending: EMERGENCY MEDICINE
Payer: COMMERCIAL

## 2023-03-13 VITALS
WEIGHT: 160.06 LBS | SYSTOLIC BLOOD PRESSURE: 126 MMHG | OXYGEN SATURATION: 99 % | HEART RATE: 84 BPM | RESPIRATION RATE: 16 BRPM | HEIGHT: 65 IN | DIASTOLIC BLOOD PRESSURE: 86 MMHG

## 2023-03-13 DIAGNOSIS — Z98.890 OTHER SPECIFIED POSTPROCEDURAL STATES: Chronic | ICD-10-CM

## 2023-03-13 DIAGNOSIS — Z93.1 GASTROSTOMY STATUS: Chronic | ICD-10-CM

## 2023-03-13 PROCEDURE — 49465 FLUORO EXAM OF G/COLON TUBE: CPT

## 2023-03-13 PROCEDURE — L8699: CPT

## 2023-03-13 PROCEDURE — 43762 RPLC GTUBE NO REVJ TRC: CPT

## 2023-03-13 PROCEDURE — 99284 EMERGENCY DEPT VISIT MOD MDM: CPT

## 2023-03-13 PROCEDURE — 99284 EMERGENCY DEPT VISIT MOD MDM: CPT | Mod: 25

## 2023-03-13 NOTE — ED ADULT NURSE NOTE - OBJECTIVE STATEMENT
Patient with disloged G-tube. Dr. Tineo at bedside to replace tube, no other complaints. pt w/ hx of ALS, communicates via computer, wife at bedside.

## 2023-03-13 NOTE — ED ADULT TRIAGE NOTE - CHIEF COMPLAINT QUOTE
Pt BIBA for peg tube replacement. He had a Peg that fell out the other day. A new one was put in but they called his wife today to states that its too large and needs to be replaced again. Pt is nonverbal at baseline.

## 2023-03-13 NOTE — ED PROVIDER NOTE - OBJECTIVE STATEMENT
57M hx ALS, nonverbal at baseline, presents for G-tube issue. Pt seen in the ED 3 days ago, had G-tube replaced. Post-discharge, tube was noted to be in duodenal bulb, so pt was called to ED for further evaluation. No new issues since discharge including abd pain, N/V/D, back pain, recent illness

## 2023-03-13 NOTE — ED ADULT NURSE NOTE - NSIMPLEMENTINTERV_GEN_ALL_ED
Implemented All Fall Risk Interventions:  [place] to call system. Call bell, personal items and telephone within reach. Instruct patient to call for assistance. Room bathroom lighting operational. Non-slip footwear when patient is off stretcher. Physically safe environment: no spills, clutter or unnecessary equipment. Stretcher in lowest position, wheels locked, appropriate side rails in place. Provide visual cue, wrist band, yellow gown, etc. Monitor gait and stability. Monitor for mental status changes and reorient to person, place, and time. Review medications for side effects contributing to fall risk. Reinforce activity limits and safety measures with patient and family.
Breath sounds clear and equal bilaterally.

## 2023-03-13 NOTE — ED PROVIDER NOTE - PHYSICAL EXAMINATION
General: Awake, alert, lying in bed in NAD  HEENT: EOMI. No scleral icterus or conjunctival injection. Moist mucous membranes.   Neck:. Soft and supple. Trachea midline  Cardiac: Extremities warm and well perfused. No LE edema.  Resp: No respiratory distress or accessory muscle use. Speaking in full sentences.   Abd: G-tube in place, no surrounding erythema or drainage. Soft, non-distended. No overlying skin changes  Skin: No rashes, abrasions, or lacerations.  Neuro: AO x 4. Moves all extremities symmetrically. Motor strength and sensation grossly intact.  Psych: Appropriate mood and affect

## 2023-03-13 NOTE — ED PROVIDER NOTE - ATTENDING CONTRIBUTION TO CARE
57-year-old male; with past medical history significant for bronchiectasis, IV ALS, dysphagia, status post gastrostomy tube; now presenting with need for PEG replacement.  Patient's wife states patient was here 2 days ago for PEG replacement and had different company/style of tubing which is incompatible with feeding equipment at home.  Now here for replacement.  Denies any other complaints.  General:     NAD  Head:     NC/AT, EOMI, oral mucosa moist  Neck:     trachea midline  Lungs:     CTA b/l, no w/r/r  CVS:     S1S2, RRR, no m/g/r  Abd:     +BS, s/nt/nd, no organomegaly  A/P:  57yoM p/w peg malfunction  -peg replacement, xray, re-eval

## 2023-03-13 NOTE — ED PROVIDER NOTE - NS ED ROS FT
General: Denies fever, chills  HEENT: Denies sore throat  Neck: Denies neck pain  Resp: Denies coughing, SOB  Cardiovascular: Denies CP, palpitations, LE edema  GI: Denies nausea, vomiting, abdominal pain, diarrhea, constipation, blood in stool  : Denies dysuria, hematuria, frequency, incontinence  MSK: Denies back pain  Neuro: Denies HA, dizziness, numbness, weakness  Skin: Denies rashes.

## 2023-03-13 NOTE — ED ADULT NURSE NOTE - NSFALLRSKOUTCOME_ED_ALL_ED
----- Message from Debbie Paulino MD sent at 9/14/2018 12:57 PM EDT -----  Hi can you please call the lab and see if pt had labs drawn.   I wanted to follow her cbc
[facility] will [facility] what is final, some results are still pending.
Fall Risk

## 2023-03-13 NOTE — ED PROVIDER NOTE - PATIENT PORTAL LINK FT
You can access the FollowMyHealth Patient Portal offered by [facility] by registering at the following website: [email or web address]. By joining [facility]’s FollowMyHealth portal, you will also be able to view your health information using other applications (apps) compatible with our system.

## 2023-03-13 NOTE — ED PROVIDER NOTE - NSFOLLOWUPINSTRUCTIONS_ED_ALL_ED_FT
You were seen in the emergency room for G-tube replacement. The tube was replaced and confirmed with x-ray. Follow up with your outpatient providers as needed. You were seen in the emergency room for G-tube replacement. The tube was replaced and confirmed with x-ray. Follow up with your outpatient providers as needed.      PEG Tube Home Guide      A PEG tube is used to put food, medicine, and fluids into the stomach. Before you leave the hospital, make sure that you know:  •How to care for your PEG tube.      •How to care for the opening (stoma) in your belly.      •How to give yourself a feeding.      •How to give yourself medicines.      •When to call your doctor for help.        Supplies needed:    •Soapy water.      •Clean, plain water.      •Clean washcloth.      •Bandage (dressing). This is optional.      •Syringe.        How to care for a PEG tube    Check your PEG tube every day. Make sure:  •It is not too tight.      •It is in the correct place. There is a elia on the tube that shows when the tube is in the correct place. Adjust the tube if you need to.        Cleaning your stoma  An abdomen with a PEG tube positioned in a stoma.  Clean your stoma every day. Follow these steps:  1.Wash your hands with soap and water for at least 20 seconds. If you cannot use soap and water, use hand .    2.Check your stoma. Let your doctor know if there is:  •Redness.      •Swelling.      •Leaking.      •Skin irritation.        3.Wash the stoma gently with warm, soapy water.      4.Rinse the stoma with plain water.      5.Pat the stoma area dry.      6.Place a bandage over the stoma if your doctor told you to do that.        Giving yourself a feeding  A person pushing water out of a syringe into a feeding tube to clean, or flush, the tube.  Your doctor will tell you:  •How much nutrition and fluid you will need for each feeding.      •How often to have a feeding.      •Whether you should take medicine in the tube by itself or with a feeding.      To give yourself a feeding, follow these steps:  1.Lay out all of the things that you will need.      2.Make sure that the nutritional formula is at room temperature.      3.Wash your hands with soap and water for at least 20 seconds. If you cannot use soap and water, use hand .      4.Sit up or stand up straight. You will need to stay sitting up or standing up while you give yourself a feeding.      5.Make sure the syringe plunger is pushed in. Place the tip of the syringe in clean water, and slowly pull the plunger to bring (draw up) the water into the syringe.      6.Remove the clamp and the cap from the PEG tube.      7.Push the water out of the syringe to clean (flush) the tube.      8.If the tube is clear, draw up the formula into the syringe. Make sure to use the right amount for each feeding. Add water if you need to.      9.Slowly push the formula from the syringe through the tube.      10.After the feeding, flush the tube with water.      11.Put the clamp and the cap on the tube.      12.Stay sitting up or standing up straight for at least 30 minutes.      Use the feeding tube equipment, such as syringes and connectors, as told by your doctor.      Giving yourself medicine  Washing hands with soap and water.  To give yourself medicine, follow these steps:  1.Lay out all of the things that you will need.      2.If your medicine is in tablet form, crush it and dissolve it in water.      3.Wash your hands with soap and water for at least 20 seconds. If you cannot use soap and water, use hand .      4.Sit up or stand up straight. You will need to stay sitting up or standing up while you give yourself medicine.      5.Make sure the syringe plunger is pushed in. Place the tip of the syringe in clean water, and slowly pull the plunger to bring (draw up) the water into the syringe.      6.Remove the clamp and the cap from the PEG tube.      7.Push the water out of the syringe to clean (flush) the tube.      8.If the tube is clear, draw up the medicine into the syringe.      9.Slowly push the medicine from the syringe through the tube.      10.Flush the tube with water.      11.Put the clamp and the cap on the tube.      12.Stay sitting up or standing up straight for at least 30 minutes.      Do not take sustained release (SR) medicines through your tube. If you are not sure if your medicine is an SR medicine, ask your doctor.      Contact a doctor if:    •The area around your stoma is sore, irritated, or red.      •You have belly pain or bloating while you are feeding or after you feed.      •You feel like you may vomit (nauseous) and the feeling will not go away.      •You have trouble pooping (constipation) or you have watery poop (diarrhea) for a long time.      •You have a fever.      •You have problems with your PEG tube.        Get help right away if:    •Your tube is blocked.      •Your tube falls out.      •You have pain around your stoma.      •You are bleeding from your stoma.      •Your tube is leaking.      •You choke or you have trouble breathing while you are feeding or after you feed.        Summary    •A PEG tube is used to put food and fluids into the stomach.      •Before you leave the hospital, you will be taught how to use and care for your PEG tube.      •Your doctor will give you instructions on how to give yourself feedings and medicines.      •Contact your doctor if you have a fever or soreness, redness, or irritation around your stoma.      •Get help right away if your tube leaks, is blocked, or falls out. Also, get help right away if you have pain or bleeding around your stoma.

## 2023-03-13 NOTE — ED ADULT NURSE NOTE - CHIEF COMPLAINT QUOTE
90 day request for Topamax from pharmacy - denied with note to leave as filled.
Pt BIBA for peg tube replacement. He had a Peg that fell out the other day. A new one was put in but they called his wife today to states that its too large and needs to be replaced again. Pt is nonverbal at baseline.

## 2023-03-13 NOTE — ED ADULT NURSE NOTE - BREATHING, MLM
[FreeTextEntry1] : Patient is a 58yo M with HTN, obesity, mild MR here for cardiac follow up. \par -\par -Nuclear stress test 12/2021 without ischemia, CAC = 1 1/2022\par -Echo with mild LVH 2021 , otherwise unremarkable\par -Needs weight loss, BP better and controlled today \par -venous duplex to eval edema negative for DVT 10/2021 \par -Carotid from 2020 at [facility] with minimal plaque\par \par 1. Recommend 30 minutes moderate intensity aerobic activity 5 days per week \par 2. Recommend aggressive diet and lifestyle modifications \par 3. Continue current antihypertensives, blood pressure is well controlled\par 4. Annual follow up, consider repeat echo then to eval chamber dimensions \par 5. \par 6.
Spontaneous, unlabored and symmetrical

## 2023-06-02 NOTE — PACU DISCHARGE NOTE - AIRWAY PATENCY:
Satisfactory Bcc Infiltrative Histology Text: There were aggregates of basaloid cells demonstrating an infiltrative pattern.

## 2024-03-30 NOTE — ED ADULT NURSE NOTE - NS ED NURSE LEVEL OF CONSCIOUSNESS ORIENTATION
Oriented - self; Oriented - place; Oriented - time
urgent  comminuted bilateral nasal fracture    ENT or facial plastics